# Patient Record
Sex: MALE | Race: WHITE
[De-identification: names, ages, dates, MRNs, and addresses within clinical notes are randomized per-mention and may not be internally consistent; named-entity substitution may affect disease eponyms.]

---

## 2018-11-30 ENCOUNTER — HOSPITAL ENCOUNTER (INPATIENT)
Dept: HOSPITAL 75 - ER | Age: 68
LOS: 7 days | Discharge: HOSPICE | DRG: 40 | End: 2018-12-07
Attending: FAMILY MEDICINE | Admitting: INTERNAL MEDICINE
Payer: MEDICARE

## 2018-11-30 VITALS — SYSTOLIC BLOOD PRESSURE: 112 MMHG | DIASTOLIC BLOOD PRESSURE: 70 MMHG

## 2018-11-30 VITALS — DIASTOLIC BLOOD PRESSURE: 82 MMHG | SYSTOLIC BLOOD PRESSURE: 125 MMHG

## 2018-11-30 VITALS — WEIGHT: 180 LBS | HEIGHT: 70 IN | BODY MASS INDEX: 25.77 KG/M2

## 2018-11-30 DIAGNOSIS — C78.02: ICD-10-CM

## 2018-11-30 DIAGNOSIS — C79.31: Primary | ICD-10-CM

## 2018-11-30 DIAGNOSIS — R41.82: ICD-10-CM

## 2018-11-30 DIAGNOSIS — J43.9: ICD-10-CM

## 2018-11-30 DIAGNOSIS — C34.81: ICD-10-CM
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
## 2018-11-30 DIAGNOSIS — C79.72: ICD-10-CM

## 2018-11-30 DIAGNOSIS — C79.71: ICD-10-CM

## 2018-11-30 DIAGNOSIS — F10.20: ICD-10-CM

## 2018-11-30 DIAGNOSIS — F03.90: ICD-10-CM

## 2018-11-30 DIAGNOSIS — S50.311A: ICD-10-CM

## 2018-11-30 DIAGNOSIS — C77.2: ICD-10-CM

## 2018-11-30 DIAGNOSIS — G93.6: ICD-10-CM

## 2018-11-30 DIAGNOSIS — C78.7: ICD-10-CM

## 2018-11-30 DIAGNOSIS — Z74.2: ICD-10-CM

## 2018-11-30 DIAGNOSIS — F17.210: ICD-10-CM

## 2018-11-30 DIAGNOSIS — R29.6: ICD-10-CM

## 2018-11-30 LAB
ALBUMIN SERPL-MCNC: 3.7 GM/DL (ref 3.2–4.5)
ALP SERPL-CCNC: 66 U/L (ref 40–136)
ALT SERPL-CCNC: 23 U/L (ref 0–55)
APTT PPP: YELLOW S
BACTERIA #/AREA URNS HPF: NEGATIVE /HPF
BASOPHILS # BLD AUTO: 0.1 10^3/UL (ref 0–0.1)
BASOPHILS NFR BLD AUTO: 1 % (ref 0–10)
BILIRUB SERPL-MCNC: 0.5 MG/DL (ref 0.1–1)
BILIRUB UR QL STRIP: NEGATIVE
BUN/CREAT SERPL: 18
CALCIUM SERPL-MCNC: 9.8 MG/DL (ref 8.5–10.1)
CHLORIDE SERPL-SCNC: 106 MMOL/L (ref 98–107)
CO2 SERPL-SCNC: 22 MMOL/L (ref 21–32)
CREAT SERPL-MCNC: 0.96 MG/DL (ref 0.6–1.3)
EOSINOPHIL # BLD AUTO: 0.2 10^3/UL (ref 0–0.3)
EOSINOPHIL NFR BLD AUTO: 2 % (ref 0–10)
ERYTHROCYTE [DISTWIDTH] IN BLOOD BY AUTOMATED COUNT: 13.8 % (ref 10–14.5)
FIBRINOGEN PPP-MCNC: CLEAR MG/DL
GFR SERPLBLD BASED ON 1.73 SQ M-ARVRAT: > 60 ML/MIN
GLUCOSE SERPL-MCNC: 94 MG/DL (ref 70–105)
GLUCOSE UR STRIP-MCNC: NEGATIVE MG/DL
HCT VFR BLD CALC: 44 % (ref 40–54)
HGB BLD-MCNC: 15 G/DL (ref 13.3–17.7)
KETONES UR QL STRIP: (no result)
LEUKOCYTE ESTERASE UR QL STRIP: (no result)
LYMPHOCYTES # BLD AUTO: 1.8 X 10^3 (ref 1–4)
LYMPHOCYTES NFR BLD AUTO: 19 % (ref 12–44)
MANUAL DIFFERENTIAL PERFORMED BLD QL: NO
MCH RBC QN AUTO: 32 PG (ref 25–34)
MCHC RBC AUTO-ENTMCNC: 34 G/DL (ref 32–36)
MCV RBC AUTO: 93 FL (ref 80–99)
MONOCYTES # BLD AUTO: 1.2 X 10^3 (ref 0–1)
MONOCYTES NFR BLD AUTO: 13 % (ref 0–12)
NEUTROPHILS # BLD AUTO: 6.2 X 10^3 (ref 1.8–7.8)
NEUTROPHILS NFR BLD AUTO: 66 % (ref 42–75)
NITRITE UR QL STRIP: NEGATIVE
PH UR STRIP: 5 [PH] (ref 5–9)
PLATELET # BLD: 332 10^3/UL (ref 130–400)
PMV BLD AUTO: 10.1 FL (ref 7.4–10.4)
POTASSIUM SERPL-SCNC: 4.4 MMOL/L (ref 3.6–5)
PROT SERPL-MCNC: 7.8 GM/DL (ref 6.4–8.2)
PROT UR QL STRIP: (no result)
RBC # BLD AUTO: 4.67 10^6/UL (ref 4.35–5.85)
RBC #/AREA URNS HPF: (no result) /HPF
SODIUM SERPL-SCNC: 140 MMOL/L (ref 135–145)
SP GR UR STRIP: 1.02 (ref 1.02–1.02)
SQUAMOUS #/AREA URNS HPF: (no result) /HPF
TSH SERPL DL<=0.05 MIU/L-ACNC: 0.93 UIU/ML (ref 0.35–4.94)
UROBILINOGEN UR-MCNC: 1 MG/DL
WBC # BLD AUTO: 9.5 10^3/UL (ref 4.3–11)
WBC #/AREA URNS HPF: (no result) /HPF

## 2018-11-30 PROCEDURE — 96374 THER/PROPH/DIAG INJ IV PUSH: CPT

## 2018-11-30 PROCEDURE — 71045 X-RAY EXAM CHEST 1 VIEW: CPT

## 2018-11-30 PROCEDURE — 81000 URINALYSIS NONAUTO W/SCOPE: CPT

## 2018-11-30 PROCEDURE — 71260 CT THORAX DX C+: CPT

## 2018-11-30 PROCEDURE — 80048 BASIC METABOLIC PNL TOTAL CA: CPT

## 2018-11-30 PROCEDURE — 88341 IMHCHEM/IMCYTCHM EA ADD ANTB: CPT

## 2018-11-30 PROCEDURE — 70450 CT HEAD/BRAIN W/O DYE: CPT

## 2018-11-30 PROCEDURE — 88305 TISSUE EXAM BY PATHOLOGIST: CPT

## 2018-11-30 PROCEDURE — 74177 CT ABD & PELVIS W/CONTRAST: CPT

## 2018-11-30 PROCEDURE — 80053 COMPREHEN METABOLIC PANEL: CPT

## 2018-11-30 PROCEDURE — 85027 COMPLETE CBC AUTOMATED: CPT

## 2018-11-30 PROCEDURE — 73080 X-RAY EXAM OF ELBOW: CPT

## 2018-11-30 PROCEDURE — 88184 FLOWCYTOMETRY/ TC 1 MARKER: CPT

## 2018-11-30 PROCEDURE — 88185 FLOWCYTOMETRY/TC ADD-ON: CPT

## 2018-11-30 PROCEDURE — 85025 COMPLETE CBC W/AUTO DIFF WBC: CPT

## 2018-11-30 PROCEDURE — 88342 IMHCHEM/IMCYTCHM 1ST ANTB: CPT

## 2018-11-30 PROCEDURE — 84443 ASSAY THYROID STIM HORMONE: CPT

## 2018-11-30 PROCEDURE — 36415 COLL VENOUS BLD VENIPUNCTURE: CPT

## 2018-11-30 PROCEDURE — 87081 CULTURE SCREEN ONLY: CPT

## 2018-11-30 RX ADMIN — Medication SCH ML: at 20:39

## 2018-11-30 RX ADMIN — PANTOPRAZOLE SODIUM SCH MG: 40 TABLET, DELAYED RELEASE ORAL at 16:11

## 2018-11-30 RX ADMIN — DEXAMETHASONE SCH MG: 4 TABLET ORAL at 23:38

## 2018-11-30 RX ADMIN — DEXAMETHASONE SCH MG: 4 TABLET ORAL at 17:59

## 2018-11-30 NOTE — DIAGNOSTIC IMAGING REPORT
PROCEDURE: CT head without contrast.



TECHNIQUE: Multiple contiguous axial images were obtained through

the brain without the use of intravenous contrast.



INDICATION: Falls.



Comparison is made with prior head CT from 10/02/2014.



There are numerous circumscribed hyperdense lesions throughout

bilateral cerebral and cerebellar hemispheres. There is also a

mass located within the khoi. The largest lesion is in the left

frontal lobe measuring 2 cm in diameter. The lesions range from a

7 mm to 20 mm. There is a moderate amount of perilesional edema

present. No midline shift is seen. No hydrocephalus is

identified.



IMPRESSION: Innumerable rounded hyperdense lesions throughout

bilateral cerebral and cerebellar hemispheres with surrounding

vasogenic edema. Features are most suggestive of widespread

intracranial metastatic disease.



Dictated by: 



  Dictated on workstation # [id number]

## 2018-11-30 NOTE — XMS REPORT
Continuity of Care Document

 Created on: 2018



ALECIA FRANCES

External Reference #: [id number]

: 1950

Sex: Male



Demographics







 Address  [address]  [address]

 

 Home Phone  [phone] x

 

 Preferred Language  Unknown

 

 Marital Status  Unknown

 

 [employer] Affiliation  Unknown

 

 Race  Unknown

 

 Ethnic Group  Unknown





Author







 Author  [facility]

 

 Address  Unknown

 

 Phone  Unavailable



              



Allergies

      





 Active            Description            Code            Type            
Severity            Reaction            Onset            Reported/Identified   
         Relationship to Patient            Clinical Status        

 

 Yes            No Known Drug Allergies            [id number]            Drug 
Allergy            Unknown            N/A                         2012   
                               

 

 Yes            codeine                         Drug Allergy            N/A    
        N/A                         10/02/2014                                  



                    



Medications

      



There is no data.                  



Problems

      





 Date Dx Coded            Attending            Type            Code            
Diagnosis            Diagnosed By        

 

 2012                         Ot            V58.32                       
           

 

 10/02/2014            RAJAN PEREZ MD                         682.1       
     CELLULITIS AND ABSCESS OF NECK                     

 

 10/05/2014            FLORA HARRELL, VÍCTOR CHAVARRIA            Ot            038.9      
                            

 

 10/05/2014            FLORA HARRELL, VÍCTOR CHAVARRIA            Ot            305.00     
                             

 

 10/05/2014            FLORA HARRELL, VÍCTOR CHAVARRIA            Ot            478.29     
                             

 

 10/05/2014            FLORA HARRELL, VÍCTOR CHAVARRIA            Ot            527.3      
                            

 

 10/05/2014            FLORA HARRELL, VÍCTOR CHAVARRIA            Ot            787.20     
                             

 

 10/05/2014            FLORA HARRELL, VÍCTOR AURA            Ot            995.91     
                             

 

 10/31/2014            LOWE DDS, ROSA MARIA            Ot            527.2        
                          

 

 2014            LOWE DDS, ROSA MARIA            Ot            527.2        
                          

 

 2014            LOWE DDS, ROSA MARIA            Ot            V72.84       
                           

 

 2014            LOWE DDS, ROSA MARIA            Ot            527.2        
                          

 

 2014            LOWE DDS, ROSA MARIA            Ot            V72.84       
                           



                                          



Procedures

      



There is no data.                  



Results

      



There is no data.              



Encounters

      





 ACCT No.            Visit Date/Time            Discharge            Status    
        Pt. Type            Provider            Facility            Loc./Unit  
          Complaint        

 

 [id number]            10/02/2014 14:01:00            10/02/2014 23:59:59          
  CLS            Outpatient            RAJAN PEREZ MD                     
                          

 

 [id number]            10/31/2014 11:58:00            10/31/2014 18:30:00    
        DIS            Outpatient            ROSA MARIA DE PAZ DDS            [facility]ia 
[facility]                     

 

 [id number]            10/24/2014 13:57:00            10/24/2014 23:59:59    
        CLS            Outpatient            ROSA MARIA DE PAZ DDS            [facility]ia 
[facility]            PREOP                     

 

 [id number]            10/02/2014 15:04:00            10/05/2014 13:50:00    
        DIS            Inpatient            FLORA HARRELL, VÍCTOR CHAVARRIA            [facility]ia 
[facility]            SURGICAL                     

 

 [id number]            2012 14:16:00                                   
   Document Registration

## 2018-11-30 NOTE — DIAGNOSTIC IMAGING REPORT
PROCEDURE: CT chest, abdomen, and pelvis with contrast.



TECHNIQUE: Multiple contiguous axial images were obtained through

the chest, abdomen, and pelvis after the administration of

intravenous contrast.



INDICATION: Brain lesions, altered mental status, cough.



FINDINGS:



Chest:



There is extensive thoracic lymphadenopathy, most suggestive of

widespread metastatic disease. Abnormal ovoid axillary nodes on

the left measured a maximal diameter of 2.9 cm. Right paramedian

prevascular anterior mediastinal orly mass is 2.7 cm. There is a

large aggregate confluent right superior paratracheal orly mass

displacing the junction of the innominate bones and SVC

anteriorly without venous obstruction. At the level of the

junction of the innominates, that mass measures 6.4 x 4.5 cm and

deviates the contour of the adjacent right lateral tracheal wall.

Its caudal extent of the mass is contiguous with right lower

paratracheal mediastinal adenopathy as well as contiguous with

right hilar adenopathy. The mass in aggregate at the level of the

torin is measuring 7.8 x 4.8 cm. There is subcarinal

lymphadenopathy. The largest subcarinal mass measuring 4.1 cm.

There is paraesophageal adenopathy measuring 2 cm. A right

superior pulmonary hilar node measures 2 cm. The left lower lobe

lung mass is likely metastatic measuring 1 cm. Subpleural mass in

the right lower lobe measured 8 mm. Right basilar pulmonary mass

measures 19 mm.



Abdomen and pelvis:



There are findings of widespread multifocal hepatic metastatic

disease involving left and right lobes. The largest right lobe

mass is 4.3 cm. A mass in the caudate lobe is 2.8 cm. Largest

left lobe mass is 2 cm. Thicker marker bilateral adrenal masses,

presumptively metastatic. The larger on the right measured 4 x

2.6 cm. There is upper abdominal mesenteric lymphadenopathy. The

largest bilobed lesion is 3.7 cm long axis. Pancreas itself

appeared unremarkable. There is no bile duct dilatation. The

unobstructed kidneys were normal. There is splenic granulomata,

benign and incidental, as well as adjacent splenules noted

incidentally. A mass lateral to the upper pole of the left kidney

and distal to the pancreatic tail is 1.5 cm, presumed metastatic

deposit. There is unruptured atherosclerotic irregular infrarenal

abdominal aortic aneurysm measuring 4.5 x 4.0 cm in transverse

dimensions. The aneurysm extends a cephalocaudal length of about

5 cm. It terminates prior to the aortic bifurcation and the

common internal and external iliac branches are calcified but

patent and nonaneurysmal. There is colonic constipation and

extensive sigmoid diverticulosis without features of acute

diverticulitis. The appendix is normal. Colon stool load, its

tortuosity, and its diverticulation is limited in its evaluation.

No identifiable colonic mass was found. Urinary bladder appeared

unremarkable. There was no suspicious lytic or sclerotic bony

lesion.



IMPRESSION:

1. A metastatic pattern in the chest with hilar, axillary, and

mediastinal lymphadenopathy. Multiple lung masses, likely

metastatic. Background COPD and interstitial lung disease,

chronic. No thoracic effusion or chest wall lesion evident

2. Abdomen and pelvis: Widespread metastatic disease to the

liver, abdominal lymph nodes, and adrenal glands. Diverticulated

and constipated colon appeared otherwise nonfocal. Unruptured

atherosclerotic infrarenal abdominal aortic aneurysm.



Dictated by: 



  Dictated on workstation # [id number]

## 2018-11-30 NOTE — XMS REPORT
Continuity of Care Document

 Created on: 2018



ALECIA FRANCES

External Reference #: [id number]

: 1950

Sex: Male



Demographics







 Address  [address]  [address]

 

 Home Phone  [phone] x

 

 Preferred Language  Unknown

 

 Marital Status  Unknown

 

 Scientologist Affiliation  Unknown

 

 Race  Unknown

 

 Ethnic Group  Unknown





Author







 Author  [facility]

 

 Address  Unknown

 

 Phone  Unavailable



              



Allergies

      





 Active            Description            Code            Type            
Severity            Reaction            Onset            Reported/Identified   
         Relationship to Patient            Clinical Status        

 

 Yes            No Known Drug Allergies            [id number]            Drug 
Allergy            Unknown            N/A                         2012   
                               

 

 Yes            codeine                         Drug Allergy            N/A    
        N/A                         10/02/2014                                  



                    



Medications

      



There is no data.                  



Problems

      





 Date Dx Coded            Attending            Type            Code            
Diagnosis            Diagnosed By        

 

 2012                         Ot            V58.32                       
           

 

 10/02/2014            RAJAN PEREZ MD                         682.1       
     CELLULITIS AND ABSCESS OF NECK                     

 

 10/05/2014            FLORA HARRELL, VÍCTOR CHAVARRIA            Ot            038.9      
                            

 

 10/05/2014            FLORA HARRELL, VÍCTOR CHAVARRIA            Ot            305.00     
                             

 

 10/05/2014            FLORA HARRELL, VÍCTOR CHAVARRIA            Ot            478.29     
                             

 

 10/05/2014            FLORA HARRELL, VÍCTOR CHAVARRIA            Ot            527.3      
                            

 

 10/05/2014            FLORA HARRELL, VÍCTOR CHAVARRIA            Ot            787.20     
                             

 

 10/05/2014            FLORA HARRELL, VÍCTOR AURA            Ot            995.91     
                             

 

 10/31/2014            LOWE DDS, ROSA MARIA            Ot            527.2        
                          

 

 2014            LOWE DDS, ROSA MARIA            Ot            527.2        
                          

 

 2014            LOWE DDS, ROSA MARIA            Ot            V72.84       
                           

 

 2014            LOWE DDS, ROSA MARIA            Ot            527.2        
                          

 

 2014            LOWE DDS, ROSA MARIA            Ot            V72.84       
                           



                                          



Procedures

      



There is no data.                  



Results

      



There is no data.              



Encounters

      





 ACCT No.            Visit Date/Time            Discharge            Status    
        Pt. Type            Provider            Facility            Loc./Unit  
          Complaint        

 

 [id number]            10/02/2014 14:01:00            10/02/2014 23:59:59          
  CLS            Outpatient            RAJAN PEREZ MD                     
                          

 

 [id number]            10/31/2014 11:58:00            10/31/2014 18:30:00    
        DIS            Outpatient            ROSA MARIA DE PAZ DDS            [facility]ia 
[facility]                     

 

 [id number]            10/24/2014 13:57:00            10/24/2014 23:59:59    
        CLS            Outpatient            ROSA MARIA DE PAZ DDS            [facility] 
[facility]            PREOP                     

 

 [id number]            10/02/2014 15:04:00            10/05/2014 13:50:00    
        DIS            Inpatient            FLORA HARRELL, VÍCTOR CHAVARRIA            [facility] 
[facility]            SURGICAL                     

 

 [id number]            2012 14:16:00                                   
   Document Registration

## 2018-11-30 NOTE — ED GENERAL
General


Chief Complaint:  Neurological Problems


Stated Complaint:  WEAKNESS;AMS


Nursing Triage Note:  


THE PT ARRIVAL BY EMS.











NO DISTRESS OR VISIBLE SX OF INJURY ARE REPORTED. 











LOC IS NORMAL FOR THE PT WITH A HX OF DEMENTIA.


Nursing Sepsis Screen:  No Definite Risk


Source of Information:  Patient, EMS, Family


Exam Limitations:  Physical Impairments





History of Present Illness


Date Seen by Provider:  2018


Time Seen by Provider:  11:45


Initial Comments


Patient presents by EMS with concerns about progressive weakness and frequent 

falls.  He is also had confusion.  He has been living with his frail older 

sister who has been caring for him for the past few weeks.  She presumed that 

he had progressive dementia.  Patient's condition has progressed to the point 

that sister can no longer care for him.  He has not seen a doctor since he 

received a health screening for placement in the [facility] a year ago.  He has no primary care provider.





Allergies and Home Medications


Allergies


Coded Allergies:  


     No Known Drug Allergies (Unverified , 12)





Home Medications


No Active Prescriptions or Reported Meds





Patient Home Medication List


Home Medication List Reviewed:  Yes





Review of Systems


Review of Systems


Constitutional:  see HPI


EENTM:  no symptoms reported


Respiratory:  no symptoms reported


Cardiovascular:  no symptoms reported


Genitourinary:  no symptoms reported


Musculoskeletal:  see HPI, other (Right elbow pain)


Skin:  see HPI, other (Skin tear on right elbow)


Psychiatric/Neurological:  See HPI


Hematologic/Lymphatic:  No Symptoms Reported


Immunological/Allergic:  no symptoms reported





Past Medical-Social-Family Hx


Past Med/Social Hx:  Reviewed and Corrections made


Patient Social History


Recent Foreign Travel:  No


Contact w/Someone Who Travel:  No


Recent Infectious Disease Expo:  No


Physical Abuse:  No


Sexual Abuse:  No


Mistreated:  No


Fear:  No





Past Medical History


Surgeries:  Yes (incision and drainage of submandibular abscess)


Respiratory:  No


Cardiac:  No


Neurological:  No


Reproductive Disorders:  No


Gastrointestinal:  No


Musculoskeletal:  No


Endocrine:  No


HEENT:  No


Cancer:  No


Did You Recieve Any Treatments:  No


Psychosocial:  Yes (history of alcoholism and went through rehabilitation 2017)


Adverse Reaction/Blood Tranf:  No





Family Medical History





Patient reports no known family medical history.





Physical Exam


Vital Signs





Vital Signs - First Documented








 18





 11:48 15:22 15:30


 


Temp 96.2  


 


Pulse 87  


 


Resp 18  


 


B/P (MAP) 112/83 (93)  


 


Pulse Ox  99 


 


O2 Delivery   Room Air





Capillary Refill : Less Than 3 Seconds


Height, Weight, BMI


Height: 5'10.00"


Weight: 180lbs. oz. 81.046375ru;  BMI


Method:Estimated


General Appearance:  No Apparent Distress, WD/WN


HEENT:  PERRL/EOMI, Normal ENT Inspection, Pharynx Normal


Neck:  Normal Inspection


Respiratory:  Lungs Clear, Normal Breath Sounds, No Accessory Muscle Use, No 

Respiratory Distress


Cardiovascular:  Regular Rate, Rhythm, No Edema, No Murmur


Gastrointestinal:  Non Tender, Soft


Extremity:  Other (abrasion and tenderness to the right elbow over the olecranon

)


Neurologic/Psychiatric:  Alert, No Motor/Sensory Deficits, CNs II-XII Norm as 

Tested, Other (confused with no focal motor deficits)


Skin:  Normal Color, Warm/Dry, Other (minor skin tear on the right elbow)





Progress/Results/Core Measures


Suspected Sepsis


Recent Fever Within 48 Hours:  No


Infection Criteria Present:  None


New/Unexplained  Altered Menta:  No


Sepsis Screen:  No Definite Risk


SIRS


Temperature:96.2 


Pulse: 87 


Respiratory Rate: 18


 


Laboratory Tests


18 11:35: White Blood Count 9.5


Blood Pressure 112 /83 


Mean: 93


 


Laboratory Tests


18 11:35: Platelet Count 332


18 12:09: 


Creatinine 0.96, Total Bilirubin 0.5








Results/Orders


Lab Results





Laboratory Tests








Test


 18


11:35 18


12:09 18


13:00 Range/Units


 


 


White Blood Count


 9.5 


 


 


 4.3-11.0


10^3/uL


 


Red Blood Count


 4.67 


 


 


 4.35-5.85


10^6/uL


 


Hemoglobin 15.0    13.3-17.7  G/DL


 


Hematocrit 44    40-54  %


 


Mean Corpuscular Volume 93    80-99  FL


 


Mean Corpuscular Hemoglobin 32    25-34  PG


 


Mean Corpuscular Hemoglobin


Concent 34 


 


 


 32-36  G/DL





 


Red Cell Distribution Width 13.8    10.0-14.5  %


 


Platelet Count


 332 


 


 


 130-400


10^3/uL


 


Mean Platelet Volume 10.1    7.4-10.4  FL


 


Neutrophils (%) (Auto) 66    42-75  %


 


Lymphocytes (%) (Auto) 19    12-44  %


 


Monocytes (%) (Auto) 13 H   0-12  %


 


Eosinophils (%) (Auto) 2    0-10  %


 


Basophils (%) (Auto) 1    0-10  %


 


Neutrophils # (Auto) 6.2    1.8-7.8  X 10^3


 


Lymphocytes # (Auto) 1.8    1.0-4.0  X 10^3


 


Monocytes # (Auto) 1.2 H   0.0-1.0  X 10^3


 


Eosinophils # (Auto)


 0.2 


 


 


 0.0-0.3


10^3/uL


 


Basophils # (Auto)


 0.1 


 


 


 0.0-0.1


10^3/uL


 


Sodium Level  140   135-145  MMOL/L


 


Potassium Level  4.4   3.6-5.0  MMOL/L


 


Chloride Level  106     MMOL/L


 


Carbon Dioxide Level  22   21-32  MMOL/L


 


Anion Gap  12   5-14  MMOL/L


 


Blood Urea Nitrogen  17   7-18  MG/DL


 


Creatinine


 


 0.96 


 


 0.60-1.30


MG/DL


 


Estimat Glomerular Filtration


Rate 


 > 60 


 


  





 


BUN/Creatinine Ratio  18    


 


Glucose Level  94     MG/DL


 


Calcium Level  9.8   8.5-10.1  MG/DL


 


Corrected Calcium  10.0   8.5-10.1  MG/DL


 


Total Bilirubin  0.5   0.1-1.0  MG/DL


 


Aspartate Amino Transf


(AST/SGOT) 


 32 


 


 5-34  U/L





 


Alanine Aminotransferase


(ALT/SGPT) 


 23 


 


 0-55  U/L





 


Alkaline Phosphatase  66     U/L


 


Total Protein  7.8   6.4-8.2  GM/DL


 


Albumin  3.7   3.2-4.5  GM/DL


 


TSH Cascade Testing


 


 0.93 


 


 0.35-4.94


UIU/ML


 


Urine Color   YELLOW   


 


Urine Clarity   CLEAR   


 


Urine pH   5  5-9  


 


Urine Specific Gravity   1.020  1.016-1.022  


 


Urine Protein   1+ H NEGATIVE  


 


Urine Glucose (UA)   NEGATIVE  NEGATIVE  


 


Urine Ketones   1+ H NEGATIVE  


 


Urine Nitrite   NEGATIVE  NEGATIVE  


 


Urine Bilirubin   NEGATIVE  NEGATIVE  


 


Urine Urobilinogen   1  NORMAL  MG/DL


 


Urine Leukocyte Esterase   1+ H NEGATIVE  


 


Urine RBC (Auto)   3+ H NEGATIVE  


 


Urine RBC   2-5 H  /HPF


 


Urine WBC   RARE   /HPF


 


Urine Squamous Epithelial


Cells 


 


 NONE 


  /HPF





 


Urine Crystals   NONE   /LPF


 


Urine Bacteria   NEGATIVE   /HPF


 


Urine Casts   NONE   /LPF


 


Urine Mucus   NEGATIVE   /LPF


 


Urine Culture Indicated   NO   








My Orders





Orders - SUSHIL GARCIA MD


Cbc With Automated Diff (18 11:48)


Comprehensive Metabolic Panel (18 11:48)


Ua Culture If Indicated (18 11:48)


Saline Lock/Iv-Start (18 11:48)


Chest 1 View, Ap/Pa Only (18 11:48)


Thyroid Analyzer (18 11:48)


Ct Head Wo (18 11:52)


Elbow, Right, 3 Views (18 11:52)


Dexamethasone Injection (Decadron Inject (18 14:30)


Nicotine Patch (Nicoderm Patch) (18 14:30)





Medications Given in ED





Current Medications








 Medications  Dose


 Ordered  Sig/Reyna


 Route  Start Time


 Stop Time Status Last Admin


Dose Admin


 


 Dexamethasone


 Sodium Phosphate  10 mg  ONCE  ONCE


 IV  18 14:30


 18 14:31 DC 18 14:37


10 MG


 


 Nicotine  21 mg  ONCE  ONCE


 TD  18 14:30


 18 14:31 DC 18 14:36


21 MG








Vital Signs/I&O











 18





 11:48 15:22 15:30 15:45


 


Temp 96.2 96.2 97.2 


 


Pulse 87 87 70 


 


Resp 18 18 20 


 


B/P (MAP) 112/83 (93) 112/83 (93) 125/82 (96) 


 


Pulse Ox  99 93 93


 


O2 Delivery   Room Air Room Air





Capillary Refill : Less Than 3 Seconds








Blood Pressure Mean:  93








Progress Note :  


Progress Note


Lab workup was fairly benign.  CT of the head demonstrated numerous lesions and 

chest x-ray showed a widened mediastinum.  These imaging findings suggest 

metastatic cancer.  There was some edema noted on the CT of the head.  

Dexamethasone will be given to prevent worsening of edema.  Patient was 

admitted to Dr. Pereyra with consultation with Dr. Dupont and Dr. Mcclellan.  CT of 

the chest, abdomen, and pelvis with contrast was placed on the bridging orders.

  I discussed the findings with patient and his sister including my concern for 

neoplastic disease.





Diagnostic Imaging





   Diagonstic Imaging:  Xray


   Plain Films/CT/US/NM/MRI:  chest


Comments


Chest x-ray viewed by me and report reviewed.  See report below:





NAME:   ALECIA FRANCES


MED REC#:   [id number]


ACCOUNT#:   [id number]


PT STATUS:   REG ER


:   1950


PHYSICIAN:   SUSHIL GARCIA MD


ADMIT DATE:   18/ER


***Draft***


Date of Exam:18





CHEST 1 VIEW, AP/PA ONLY








INDICATION: Altered mental status.





FINDINGS: Portable chest. There is mild interstitial lung disease


bilaterally more prominent in the lung bases. There are no


consolidated infiltrates. The heart is not enlarged. There is


definite fullness of the superior mediastinum. This may be


vascular or nonvascular in nature. No definite hilar lymph nodes


are seen. No pneumothorax or pleural effusion. No bony


abnormalities.





IMPRESSION:


1. There is considerable fullness of the mediastinum. Would


consider CT scan of the chest to further evaluate.


2. Bilateral interstitial lung disease which may be acute or


chronic. The heart is not large.





  Dictated on workstation # [id number]





Dict:   18 1300


Trans:   18 1302


[facility] [id number]





Interpreted by:     NINA PARKER MD








   Diagonstic Imaging:  Xray


   Plain Films/CT/US/NM/MRI:  elbow


Comments


Elbow x-ray viewed by me and report reviewed.  See report below:





NAME:   ALECIA FRANCES


MED REC#:   [id number]


ACCOUNT#:   [id number]


PT STATUS:   REG ER


:   1950


PHYSICIAN:   SUSHIL GARCIA MD


ADMIT DATE:   18/ER


***Draft***


Date of Exam:18





ELBOW, RIGHT, 3 VIEWS





EXAMINATION:


Right elbow at 1239 hours.





INDICATION: 


Skin tear to posterior elbow.





TECHNIQUE: 


Three views of the right elbow were obtained.





COMPARISON:  


There are no prior studies available for comparison.





FINDINGS:


There is no fracture, dislocation, or acute bony abnormality


evident. There is mild degenerative disease involving the elbow


joint. There is also a small bony excrescence along the posterior


superior aspect of the olecranon process. This is in the region


of the triceps tendon insertion and most likely is a sequela of


prior injury to the triceps tendon.





There is soft tissue edema along the posterior aspect of the


elbow joint. This could be related to mild olecranon bursitis or


merely to soft tissue edema alone. There is no radiopaque foreign


body identified. 





IMPRESSION: 


There is soft tissue edema along the posterior aspect of the


elbow joint but there is no evidence for a radiopaque foreign


body or for an acute bony abnormality. 





  Dictated on workstation # [id number]





Dict:   18 1445


Trans:   18 1453


 [id number]





Interpreted by:     CHEN LOPEZ MD








   Diagonstic Imaging:  CT


   Plain Films/CT/US/NM/MRI:  head


Comments


CT head viewed by me and report reviewed.  See report below:





NAME:   ALECIA FRANCES


MED REC#:   [id number]


ACCOUNT#:   [id number]


PT STATUS:   REG ER


:   1950


PHYSICIAN:   SUSHIL GARCIA MD


ADMIT DATE:   18/ER


***Draft***


Date of Exam:18





CT HEAD WO





PROCEDURE: CT head without contrast.





TECHNIQUE: Multiple contiguous axial images were obtained through


the brain without the use of intravenous contrast.





INDICATION: Falls.





Comparison is made with prior head CT from 10/02/2014.





There are numerous circumscribed hyperdense lesions throughout


bilateral cerebral and cerebellar hemispheres. There is also a


mass located within the khoi. The largest lesion is in the left


frontal lobe measuring 2 cm in diameter. The lesions range from a


7 mm to 20 mm. There is a moderate amount of perilesional edema


present. No midline shift is seen. No hydrocephalus is


identified.





IMPRESSION: Innumerable rounded hyperdense lesions throughout


bilateral cerebral and cerebellar hemispheres with surrounding


vasogenic edema. Features are most suggestive of widespread


intracranial metastatic disease.





  Dictated on workstation # [id number]





Dict:   18 1449


Trans:   18 1453


[facility] [id number]





Interpreted by:     HOME WING MD





Departure


Communication (Admissions)


Time/Spoke to Admitting Phy:  14:15


Dr. Pereyra


Time/Spoke to Consulting Phy:  14:35


Dr. Dupont and Dr. Mcclellan





Impression





 Primary Impression:  


 Brain lesion


 Additional Impressions:  


 Cerebral edema


 Weakness


 Altered mental status


 Qualified Codes:  R41.82 - Altered mental status, unspecified


 Mediastinal widening


 Injury of right elbow


 Qualified Codes:  S59.901A - Unspecified injury of right elbow, initial 

encounter


Disposition:   ADMITTED AS INPATIENT


Condition:  Stable





Admissions


Decision to Admit Reason:  Admit from ER (General)


Decision to Admit/Date:  2018


Time/Decision to Admit Time:  14:00





Departure-Patient Inst.


Referrals:  


NO,LOCAL PHYSICIAN (PCP/Family)


Primary Care Physician


Scripts


No Active Prescriptions or Reported Meds











SUSHIL GARCIA MD 2018 14:41

## 2018-11-30 NOTE — DIAGNOSTIC IMAGING REPORT
EXAMINATION:

Right elbow at 1239 hours.



INDICATION: 

Skin tear to posterior elbow.



TECHNIQUE: 

Three views of the right elbow were obtained.



COMPARISON:  

There are no prior studies available for comparison.



FINDINGS:

There is no fracture, dislocation, or acute bony abnormality

evident. There is mild degenerative disease involving the elbow

joint. There is also a small bony excrescence along the posterior

superior aspect of the olecranon process. This is in the region

of the triceps tendon insertion and most likely is a sequela of

prior injury to the triceps tendon.



There is soft tissue edema along the posterior aspect of the

elbow joint. This could be related to mild olecranon bursitis or

merely to soft tissue edema alone. There is no radiopaque foreign

body identified. 



IMPRESSION: 

There is soft tissue edema along the posterior aspect of the

elbow joint but there is no evidence for a radiopaque foreign

body or for an acute bony abnormality. 



Dictated by: 



  Dictated on workstation # [id number]

## 2018-11-30 NOTE — DIAGNOSTIC IMAGING REPORT
INDICATION: Altered mental status.



FINDINGS: Portable chest. There is mild interstitial lung disease

bilaterally more prominent in the lung bases. There are no

consolidated infiltrates. The heart is not enlarged. There is

definite fullness of the superior mediastinum. This may be

vascular or nonvascular in nature. No definite hilar lymph nodes

are seen. No pneumothorax or pleural effusion. No bony

abnormalities.



IMPRESSION:

1. There is considerable fullness of the mediastinum. Would

consider CT scan of the chest to further evaluate.

2. Bilateral interstitial lung disease which may be acute or

chronic. The heart is not large.



Dictated by: 



  Dictated on workstation # [id number]

## 2018-12-01 VITALS — SYSTOLIC BLOOD PRESSURE: 150 MMHG | DIASTOLIC BLOOD PRESSURE: 68 MMHG

## 2018-12-01 VITALS — DIASTOLIC BLOOD PRESSURE: 72 MMHG | SYSTOLIC BLOOD PRESSURE: 103 MMHG

## 2018-12-01 VITALS — DIASTOLIC BLOOD PRESSURE: 75 MMHG | SYSTOLIC BLOOD PRESSURE: 131 MMHG

## 2018-12-01 VITALS — SYSTOLIC BLOOD PRESSURE: 108 MMHG | DIASTOLIC BLOOD PRESSURE: 75 MMHG

## 2018-12-01 VITALS — DIASTOLIC BLOOD PRESSURE: 75 MMHG | SYSTOLIC BLOOD PRESSURE: 116 MMHG

## 2018-12-01 VITALS — DIASTOLIC BLOOD PRESSURE: 79 MMHG | SYSTOLIC BLOOD PRESSURE: 127 MMHG

## 2018-12-01 RX ADMIN — DEXAMETHASONE SCH MG: 4 TABLET ORAL at 12:38

## 2018-12-01 RX ADMIN — Medication SCH ML: at 17:45

## 2018-12-01 RX ADMIN — Medication SCH ML: at 06:21

## 2018-12-01 RX ADMIN — DEXAMETHASONE SCH MG: 4 TABLET ORAL at 18:14

## 2018-12-01 RX ADMIN — Medication SCH ML: at 19:53

## 2018-12-01 RX ADMIN — PANTOPRAZOLE SODIUM SCH MG: 40 TABLET, DELAYED RELEASE ORAL at 06:21

## 2018-12-01 RX ADMIN — DEXAMETHASONE SCH MG: 4 TABLET ORAL at 06:21

## 2018-12-01 NOTE — ONCOLOGY CONSULTATION
Visit Information


Visit Information


Date of Admission


Nov 30, 2018 at 14:38


Attending Physician


Eric Pereyra MD


Admitting Physician


No,Local Physician


Chief Complaint


confusion and general weakness. CT showed multiple brain and lung lesions


Interval History


Mr. Frances is a 68 year old white man brought to ER by his sister yesterday 

complaining of confusion and weakness. Long history of alcoholism and COPD. His 

sister is not able to take care of him at home any more. Pt is confused and not 

able to give any history. He told me this is 1966. The president [place] the [place] 

[place] is [place]. However, he is able eat full meals and go to bathroom by 

himself. 


CT of head showed multiple brain lesions with surrounding edema. ER started 

Decadron last night.


CT of chest, abd and pelvis showed multiple large masses in the lung and also 

multiple lesions in the liver. See CT report below for details.


I consulted the patient on:


12/1/18


 09:48


Time Seen by Provider:  10:07





Review of Systems


Constitutional:  weakness


EENTM:  see HPI


Respiratory:  short of breath


Cardiovascular:  no symptoms reported


Gastrointestinal:  constipation


Psychiatric/Neurological:  See HPI





Health Status


Allergies


Coded Allergies:  


     No Known Drug Allergies (Unverified , 7/21/12)





Home Medications


No Active Prescriptions or Reported Meds





PMH-Social-Family Hx


Patient Social History


Alcohol Use:  Past History


Recreational Drug Use:  No


Smoking Status:  Current Everyday Smoker


Recent Foreign Travel:  No


Contact w/other who traveled:  No


Recent Infectious Disease Expo:  No


Physical Abuse Screen:  No


Sexual Abuse:  No





Family Medical History


Family History:  


Patient reports no known family medical history.





Physical Exam


Vital Signs





Vital Signs - First Documented








 11/30/18 11/30/18 11/30/18





 11:48 15:22 15:30


 


Temp 96.2  


 


Pulse 87  


 


Resp 18  


 


B/P (MAP) 112/83 (93)  


 


Pulse Ox  99 


 


O2 Delivery   Room Air





Capillary Refill : Less Than 3 Seconds


Height, Weight, BMI


Height: 5'10.00"


Weight: 180lbs. 0.0oz. 81.254415pt; 25.8 BMI


Method:Estimated


General Appearance:  No Apparent Distress


HEENT:  PERRL/EOMI


Neck:  Non Tender, Supple


Respiratory:  Chest Non Tender, Lungs Clear, No Accessory Muscle Use, No 

Respiratory Distress


Cardiovascular:  Regular Rate, Rhythm, No Edema, No JVD


Gastrointestinal:  Non Tender, Soft


Extremity:  Non Tender, No Calf Tenderness, No Pedal Edema


Neurologic/Psychiatric:  Disoriented (confused. Not able to give history)





Data Review


Labs


Laboratory Tests


12/3/18 05:25








Laboratory Tests


12/3/18 05:25: 


Blood Urea Nitrogen 20H, Glucose Level 133H








Radiology


CT of the Head 11-30-18 w w/o





There are numerous circumscribed hyperdense lesions throughout


bilateral cerebral and cerebellar hemispheres. There is also a


mass located within the khoi. The largest lesion is in the left


frontal lobe measuring 2 cm in diameter. The lesions range from a


7 mm to 20 mm. There is a moderate amount of perilesional edema


present. No midline shift is seen. No hydrocephalus is


identified.





IMPRESSION: Innumerable rounded hyperdense lesions throughout


bilateral cerebral and cerebellar hemispheres with surrounding


vasogenic edema. Features are most suggestive of widespread


intracranial metastatic disease.








NAME:      ALECIA FRANCES


MED REC#:   [id number]


ACCOUNT#:   [id number]


PHYSICIAN:    ERIC PEREYRA MD


 











Date of Exam:   11/30/18





CT CHEST/ABDOMEN/PELVIS W


 





PROCEDURE: CT chest, abdomen, and pelvis with contrast.





TECHNIQUE: Multiple contiguous axial images were obtained through


the chest, abdomen, and pelvis after the administration of


intravenous contrast.





INDICATION: Brain lesions, altered mental status, cough.





FINDINGS:





Chest:





There is extensive thoracic lymphadenopathy, most suggestive of


widespread metastatic disease. Abnormal ovoid axillary nodes on


the left measured a maximal diameter of 2.9 cm. Right paramedian


prevascular anterior mediastinal orly mass is 2.7 cm. There is a


large aggregate confluent right superior paratracheal orly mass


displacing the junction of the innominate bones and SVC


anteriorly without venous obstruction. At the level of the


junction of the innominates, that mass measures 6.4 x 4.5 cm and


deviates the contour of the adjacent right lateral tracheal wall.


Its caudal extent of the mass is contiguous with right lower


paratracheal mediastinal adenopathy as well as contiguous with


right hilar adenopathy. The mass in aggregate at the level of the


torin is measuring 7.8 x 4.8 cm. There is subcarinal


lymphadenopathy. The largest subcarinal mass measuring 4.1 cm.


There is paraesophageal adenopathy measuring 2 cm. A right


superior pulmonary hilar node measures 2 cm. The left lower lobe


lung mass is likely metastatic measuring 1 cm. Subpleural mass in


the right lower lobe measured 8 mm. Right basilar pulmonary mass


measures 19 mm.





Abdomen and pelvis:





There are findings of widespread multifocal hepatic metastatic


disease involving left and right lobes. The largest right lobe


mass is 4.3 cm. A mass in the caudate lobe is 2.8 cm. Largest


left lobe mass is 2 cm. Thicker marker bilateral adrenal masses,


presumptively metastatic. The larger on the right measured 4 x


2.6 cm. There is upper abdominal mesenteric lymphadenopathy. The


largest bilobed lesion is 3.7 cm long axis. Pancreas itself


appeared unremarkable. There is no bile duct dilatation. The


unobstructed kidneys were normal. There is splenic granulomata,


benign and incidental, as well as adjacent splenules noted


incidentally. A mass lateral to the upper pole of the left kidney


and distal to the pancreatic tail is 1.5 cm, presumed metastatic


deposit. There is unruptured atherosclerotic irregular infrarenal


abdominal aortic aneurysm measuring 4.5 x 4.0 cm in transverse


dimensions. The aneurysm extends a cephalocaudal length of about


5 cm. It terminates prior to the aortic bifurcation and the


common internal and external iliac branches are calcified but


patent and nonaneurysmal. There is colonic constipation and


extensive sigmoid diverticulosis without features of acute


diverticulitis. The appendix is normal. Colon stool load, its


tortuosity, and its diverticulation is limited in its evaluation.


No identifiable colonic mass was found. Urinary bladder appeared


unremarkable. There was no suspicious lytic or sclerotic bony


lesion.





IMPRESSION:


1. A metastatic pattern in the chest with hilar, axillary, and


mediastinal lymphadenopathy. Multiple lung masses, likely


metastatic. Background COPD and interstitial lung disease,


chronic. No thoracic effusion or chest wall lesion evident


2. Abdomen and pelvis: Widespread metastatic disease to the


liver, abdominal lymph nodes, and adrenal glands. Diverticulated


and constipated colon appeared otherwise nonfocal. Unruptured


atherosclerotic infrarenal abdominal aortic aneurysm.





Dictated by: 





  Dictated on workstation # [id number]





[id number]





Dict:      11/30/18 1710


Trans:      11/30/18 1752





Interpreted by:         GIOVANNI FRAUSTO


Electronically signed by:   GIOVANNI FRAUSTO 11/30/18 1752





Impression & Plan


Impression & Plan


IMP:


1. Multiple brain lesions with surrounding edema, symptoms of confusion and 

general weakness. 


2. Multiple lung lesions, adrenal and liver lesions, mostly carcinoma of lung 

origin with extensive liver mets and adrenal mets. 


3. H/o alcoholism


4. COPD


5. His sister is not able to take care of him at home any more.


Plan:


1. Tissue diagnosis. Most likely diagnosis, carcinoma of the lung origin. 


2. Consult Rad/Onc Dr. Durand on Monday


3. Continue Decadron 4mg po qid


4. Pepcid or PI while on Decadron


5.  consult.











WHITLEY BLOCK MD Dec 1, 2018 09:53

## 2018-12-01 NOTE — HISTORY & PHYSICAL-HOSPITALIST
History of Present Illness


HPI/Chief Complaint


Mr. Colon is a 68-year-old white male who had been staying with his sister who 

brought him into the emergency room the evening of  reporting she 

could no longer care for him.  According to the emergency room physician she 

was quite small and frail herself and stated that he had been increasingly 

confused over the past 1-2 weeks had become generally weak despite a reasonable 

appetite and it had several falls.  There have been no reported loss of 

consciousness or head trauma.  He was pleasant upon my arrival voicing no 

complaints but thought he was in the [facility] and did not know why he 

was here.  All he could tell me was that things got worse suddenly but did not 

elaborate.  He did begin sentences and then stop in mid sentence and continue 

to eat.  He denied any problems with headaches vision change on the reporting 

generalized weakness.  He denied any pain and denies any focal areas of 

weakness or numbness.  He reported no problems with bowel or bladder control 

although his history is somewhat suspect.  He also denied night sweats chills 

fever or chest pain cough or hemoptysis.


Date Seen


18


Time Seen by a Provider:  08:00


Attending Physician


Eric Pereyra MD


PCP


No,Local Physician


Referring Physician





Date of Admission


2018 at 14:38





Home Medications & Allergies


Home Medications


Reviewed patient Home Medication Reconciliation performed by pharmacy 

medication reconciliations technician and/or nursing.


Patients Allergies have been reviewed.





Allergies





Allergies


Coded Allergies


  No Known Drug Allergies (Unverified12)








Past Medical-Social-Family Hx


Past Med/Social Hx:  Reviewed and Corrections made


Patient Social History


Alcohol Use:  Past History


Recreational Drug Use:  No


Smoking Status:  Current Everyday Smoker


Physical Abuse Screen:  No


Sexual Abuse:  No


Recent Foreign Travel:  No


Contact w/other who traveled:  No


Recent Infectious Disease Expo:  No





Past Medical History


Reproductive:  No


Did You Recieve Any Treatments:  No


History of Blood Disorders:  No


Adverse Reaction to Blood Diaz:  No





Family History





Patient reports no known family medical history.





Review of Systems


Constitutional:  see HPI


Respiratory:  see HPI


Gastrointestinal:  other (Denies abdominal pain and bright red blood per rectum 

or melena.)


Psychiatric/Neurological:  See HPI





Physical Exam


Physical Exam


Vital Signs





Vital Signs - First Documented








 18





 11:48 15:22 15:30


 


Temp 96.2  


 


Pulse 87  


 


Resp 18  


 


B/P (MAP) 112/83 (93)  


 


Pulse Ox  99 


 


O2 Delivery   Room Air





Capillary Refill : Less Than 3 Seconds


Height, Weight, BMI


Height: 5'10.00"


Weight: 180lbs. 0.0oz. 81.773594hp; 25.8 BMI


Method:Estimated


General Appearance:  No Apparent Distress, WD/WN


HEENT:  PERRL/EOMI


Neck:  Full Range of Motion, Non Tender, Supple, Lymphadenopathy (R) (Roughly 2 

cm firm but mobile node underneath the right external jugular vein just above 

the clavicle.  No other definitive cervical adenopathy is noted.)


Respiratory:  Chest Non Tender, Lungs Clear, Normal Breath Sounds, No Accessory 

Muscle Use, No Respiratory Distress


Cardiovascular:  Regular Rate, Rhythm, No Edema, No Gallop, No JVD, No Murmur, 

Normal Peripheral Pulses


Gastrointestinal:  Normal Bowel Sounds, No Organomegaly, No Pulsatile Mass, Non 

Tender, Soft


Extremity:  Normal Capillary Refill, Normal Inspection, Normal Range of Motion, 

Non Tender, No Calf Tenderness, No Pedal Edema, Other (Firm but mobile left 

axillary node likely in the 3-4 cm range rather deep.  There is no avulsion of 

skin injury over the left elbow no surrounding significant erythema or 

induration noted.  It appears relatively clean.)


Neurologic/Psychiatric:  Normal Mood/Affect, CNs II-XII Norm as Tested, Other (

Patient is alert oriented to person only he's able to move all extremities but 

right lower extremity reveals 4+ strength (5+ strength.)


Skin:  Normal Color, Warm/Dry


Lymphatic:  Other (See above)





Results


Results/Procedures


Labs


Laboratory Tests


18 11:35








18 12:09








Patient resulted labs reviewed.





Assessment/Plan


Admission Diagnosis


1.  Altered mental status and right lower extremity weakness secondary to 

likely rather diffuse cerebral metastasis with significant mediastinal 

adenopathy left axillary adenopathy and right pathologic feeling lymph node 

under the external jugular vein.  After discussion with Dr. Dupont and Dr. Raymond we are in agreement that the external jugular node likely be the safest 

way to get tissue.   for discussion is in agreement as well and will be 

seeing the patient.  In the interim we'll continue Decadron 4 mg IV every 6 

with dosage management per oncology.  The patient will likely require placement 

in a nursing care facility upon discharge.


2.  History of alcohol use disorder it is unclear as to whether or not there's 

been any recent drinking.  There is no history of any other alcohol related 

medical complications.  3.  Tobaccoism likely contributing to number 1 tissue 

diagnosis pending.


Admission Status:  Inpatient Order (span 2 midnights)


Reason for Inpatient Admission:  


See admission diagnosis.





Assessment and Plan


See admission diagnosis.





Clinical Quality Measures


DVT/VTE Risk/Contraindication:


Risk Factor Score Per Nursin


RFS Level Per Nursing on Admit:  4+=Very High











REIC PEREYRA MD Dec 1, 2018 10:13

## 2018-12-01 NOTE — PULMONARY CONSULTATION
History of Present Illness


History of Present Illness


Date of Consultation


12/1/18


 09:34


Date of Admission








Allergies and Home Medications


Allergies


Coded Allergies:  


     No Known Drug Allergies (Unverified , 7/21/12)





Home Medications


No Active Prescriptions or Reported Meds





Past Medical-Social-Family Hx


Past Med/Social Hx:  Reviewed and Corrections made


Patient Social History


Alcohol Use:  Past History


Recreational Drug Use:  No


Smoking Status:  Current Everyday Smoker


Recent Foreign Travel:  No


Contact w/Someone Who Travel:  No


Recent Infectious Disease Expo:  No


Physical Abuse:  No


Sexual Abuse:  No


Mistreated:  No


Fear:  No





Past Medical History


Surgeries:  Yes (incision and drainage of submandibular abscess)


Respiratory:  No


Cardiac:  No


Neurological:  No


Reproductive Disorders:  No


Gastrointestinal:  No


Musculoskeletal:  No


Endocrine:  No


HEENT:  No


Cancer:  No


Did You Recieve Any Treatments:  No


Psychosocial:  Yes (history of alcoholism and went through rehabilitation 2017)


Integumentary:  No


Blood Disorders:  No


Adverse Reaction/Blood Tranf:  No





Family Medical History





Patient reports no known family medical history.





Sepsis Event


Evaluation


Height, Weight, BMI


Height: 5'10.00"


Weight: 180lbs. 0.0oz. 81.838682fr; 25.8 BMI


Method:Estimated





Exam


Exam





Vital Signs








  Date Time  Temp Pulse Resp B/P (MAP) Pulse Ox O2 Delivery O2 Flow Rate FiO2


 


12/1/18 08:00 98.1 84 18 150/68 (95) 96 Room Air  


 


12/1/18 08:00      Room Air  


 


12/1/18 04:06 97.2 68 18 127/79 (95) 96 Room Air  


 


12/1/18 00:30 97.8 69 18 116/75 (89) 96 Room Air  


 


11/30/18 20:03 97.4 79 18 112/70 (84) 95 Room Air  


 


11/30/18 20:00      Room Air  


 


11/30/18 15:45     93 Room Air  


 


11/30/18 15:30 97.2 70 20 125/82 (96) 93 Room Air  


 


11/30/18 15:22 96.2 87 18 112/83 (93) 99   


 


11/30/18 11:48 96.2 87 18 112/83 (93)    














I & O 


 


 12/1/18





 07:00


 


Intake Total 1700 ml


 


Balance 1700 ml








Height & Weight


Height: 5'10.00"


Weight: 180lbs. 0.0oz. 81.987639yq; 25.8 BMI


Method:Estimated


General Appearance:  No Apparent Distress, WD/WN


HEENT:  PERRL/EOMI, Normal ENT Inspection, Pharynx Normal


Neck:  Normal Inspection


Respiratory:  Lungs Clear, Normal Breath Sounds, No Accessory Muscle Use, No 

Respiratory Distress


Cardiovascular:  Regular Rate, Rhythm, No Edema, No Murmur


Capillary Refill:  Less Than 3 Seconds


Extremity:  Other (abrasion and tenderness to the right elbow over the olecranon

)


Neurologic/Psychiatric:  Alert, No Motor/Sensory Deficits, CNs II-XII Norm as 

Tested, Other (confused with no focal motor deficits)


Skin:  Normal Color, Warm/Dry, Other (minor skin tear on the right elbow)





Results


Lab


Laboratory Tests


11/30/18 11:35








11/30/18 12:09











Assessment/Plan


Assessment/Plan


Severe emphysema/COPD 


Severe mediastinal lymphadenopathy and brain lesions


   -I discussed with Dr. Pereyra and he is going to have surgery bx a 

supraclavicular lymph node.


      -He will let me know if bronchoscopy/EBUS is needed


Dementia/debility











CHRIS HOLLOWAY DO Dec 1, 2018 09:36

## 2018-12-01 NOTE — CONSULTATION
History of Present Illness


History of Present Illness


Patient Consulted On(ofe/time)


18


 12:54


Date Seen by Provider:  Dec 1, 2018


Time Seen by Provider:  12:15


Reason for Visit:  mental status changes and weakness of his body


History of Present Illness


patient admitted with mental status changes and weakness of this body, with the 

discovery of diffuse metastatic disease including cerebral lesions and 

associated edema.  Concern about primary lung carcinoma with widespread 

metastasis being raised.  I have been asked to see him for consideration of 

lymph node biopsy, to establish a definitive histologic diagnosis





Allergies and Home Medications


Allergies


Coded Allergies:  


     No Known Drug Allergies (Unverified , 12)





Home Medications


No Active Prescriptions or Reported Meds





Patient Home Medication List


Home Medication List Reviewed:  Yes





Past Medical-Social-Family Hx


Past Med/Social Hx:  Reviewed and Corrections made


Patient Social History


Alcohol Use:  Past History


Recreational Drug Use:  No


Smoking Status:  Current Everyday Smoker


Recent Foreign Travel:  No


Contact w/Someone Who Travel:  No


Recent Infectious Disease Expo:  No


Physical Abuse:  No


Sexual Abuse:  No


Mistreated:  No


Fear:  No





Past Medical History


Surgeries:  Yes (incision and drainage of submandibular abscess)


Respiratory:  No


Cardiac:  No


Neurological:  No


Reproductive Disorders:  No


Gastrointestinal:  No


Musculoskeletal:  No


Endocrine:  No


HEENT:  No


Cancer:  No


Did You Recieve Any Treatments:  No


Psychosocial:  Yes (history of alcoholism and went through rehabilitation 2017)


Integumentary:  No


Blood Disorders:  No


Adverse Reaction/Blood Tranf:  No





Family Medical History





Patient reports no known family medical history.





Review of Systems-General


Constitutional:  see HPI


EENTM:  see HPI


Respiratory:  cough, dyspnea on exertion


Cardiovascular:  no symptoms reported


Gastrointestinal:  no symptoms reported


Genitourinary:  no symptoms reported


Musculoskeletal:  back pain


Skin:  no symptoms reported


Psychiatric/Neurological:  See HPI





Physical Exam-General Problems


Physical Exam


Vital Signs





Vital Signs - First Documented








 18





 11:48 15:22 15:30


 


Temp 96.2  


 


Pulse 87  


 


Resp 18  


 


B/P (MAP) 112/83 (93)  


 


Pulse Ox  99 


 


O2 Delivery   Room Air





Capillary Refill : Less Than 3 Seconds


General Appearance:  other


Neck:  lymphadenopathy (R)


Respiratory:  decreased breath sounds, crackles


Cardiovascular:  regular rate, rhythm


Gastrointestinal:  non tender, soft, no organomegaly


Rectal:  deferred


Extremities:  normal inspection


Neurologic/Psychiatric:  facial droop, other


Skin:  warm/dry


Comments


large, mobile and nontender lymph node palpable in the left axilla.  Nontender 

lymph node over the right jugulodigastric region.





Assessment/Plan


Assessment/Plan


Admission Diagnosis/Plan


gentleman with diffuse metastatic lesion including cerebral lesions.  Primary 

lung carcinoma suspected.  It is reasonable to perform biopsy of the left 

axillary lymph node to establish a definitive diagnosis.  This is being 

scheduled for Monday, 3 December


Admission Status:  Inpatient Order (span 2 midnights)


Reason for Inpatient Admission:  


evaluation and management lasting more than 3 days





Clinical Quality Measures


DVT/VTE Risk/Contraindication:


Risk Factor Score Per Nursin


RFS Level Per Nursing on Admit:  4+=Very High











USAMA WHITE MD Dec 1, 2018 12:58

## 2018-12-02 VITALS — DIASTOLIC BLOOD PRESSURE: 63 MMHG | SYSTOLIC BLOOD PRESSURE: 106 MMHG

## 2018-12-02 VITALS — DIASTOLIC BLOOD PRESSURE: 64 MMHG | SYSTOLIC BLOOD PRESSURE: 98 MMHG

## 2018-12-02 VITALS — SYSTOLIC BLOOD PRESSURE: 100 MMHG | DIASTOLIC BLOOD PRESSURE: 64 MMHG

## 2018-12-02 RX ADMIN — DEXAMETHASONE SCH MG: 4 TABLET ORAL at 06:19

## 2018-12-02 RX ADMIN — DEXAMETHASONE SCH MG: 4 TABLET ORAL at 13:04

## 2018-12-02 RX ADMIN — DEXAMETHASONE SCH MG: 4 TABLET ORAL at 23:38

## 2018-12-02 RX ADMIN — PANTOPRAZOLE SODIUM SCH MG: 40 TABLET, DELAYED RELEASE ORAL at 06:19

## 2018-12-02 RX ADMIN — DEXAMETHASONE SCH MG: 4 TABLET ORAL at 18:09

## 2018-12-02 RX ADMIN — Medication SCH ML: at 20:53

## 2018-12-02 RX ADMIN — Medication SCH ML: at 06:19

## 2018-12-02 RX ADMIN — Medication SCH ML: at 14:19

## 2018-12-02 RX ADMIN — DEXAMETHASONE SCH MG: 4 TABLET ORAL at 00:15

## 2018-12-02 NOTE — PROGRESS NOTE-HOSPITALIST
Subjective


HPI/CC On Admission


Date Seen by Provider:  Dec 2, 2018


Time Seen by Provider:  09:45


Mr. Colon is a 68-year-old white male who had been staying with his sister who 

brought him into the emergency room the evening of  reporting she 

could no longer care for him.  According to the emergency room physician she 

was quite small and frail herself and stated that he had been increasingly 

confused over the past 1-2 weeks had become generally weak despite a reasonable 

appetite and it had several falls.  There have been no reported loss of 

consciousness or head trauma.  He was pleasant upon my arrival voicing no 

complaints but thought he was in the [facility] and did not know why he 

was here.  All he could tell me was that things got worse suddenly but did not 

elaborate.  He did begin sentences and then stop in mid sentence and continue 

to eat.  He denied any problems with headaches vision change on the reporting 

generalized weakness.  He denied any pain and denies any focal areas of 

weakness or numbness.  He reported no problems with bowel or bladder control 

although his history is somewhat suspect.  He also denied night sweats chills 

fever or chest pain cough or hemoptysis.


Subjective/Events-last exam


Patient noted to be awake and alert voicing no complaints.  He denies headache.

  His level of alertness was improved he was speaking in full sentences.  When 

asked about a neurologic change he only reported a 2 year history of left upper 

extremity numbness with no reported weakness or control problems.  He reports 

that this symptom has changed little over the past 2 years and has noted that 

this any worse than usual today.  He denies any past history of known stroke.  

He's had no nausea and his voice no complaints to staff.





Objective


Exam


Vital Signs





Vital Signs








  Date Time  Temp Pulse Resp B/P (MAP) Pulse Ox O2 Delivery O2 Flow Rate FiO2


 


18 00:00 98.0 63 18 100/64 (76) 94 Room Air  





Capillary Refill : Less Than 3 Seconds


General Appearance:  No Apparent Distress, WD/WN


Neck:  Full Range of Motion, Non Tender, Supple, Lymphadenopathy (R)


Respiratory:  Chest Non Tender, Lungs Clear, Normal Breath Sounds, No Accessory 

Muscle Use, No Respiratory Distress


Cardiovascular:  Regular Rate, Rhythm, No Edema, No Gallop, No JVD, No Murmur, 

Normal Peripheral Pulses


Gastrointestinal:  Normal Bowel Sounds, No Organomegaly, No Pulsatile Mass, Non 

Tender, Soft


Neurologic/Psychiatric:  Alert (Oriented 2 and improvement.), Normal Mood/

Affect, CNs II-XII Norm as Tested, Motor Weakness (Generalized no focal 

differences today other than slightly more weakness on plantar flexion on the 

right)


Skin:  Normal Color, Warm/Dry





Results/Procedures


Lab


Patient resulted labs reviewed.





Assessment/Plan


Assessment and Plan


Assess & Plan/Chief Complaint





1.  Altered mental status and right lower extremity weakness secondary to 

likely rather diffuse cerebral metastasis with significant mediastinal 

adenopathy left axillary adenopathy and right pathologic feeling lymph node 

under the external jugular vein.  After discussion with Dr. uDpont and Dr. Raymond he favors taking the left axillary node and we'll plan on doing so 

tomorrow.  Neurologic status has improved in regards to improve level of 

alertness and communication ability today.  No history of seizures or loss of 

consciousness reported by the patient..   for discussion is in agreement 

as well and will be seeing the patient.  In the interim we'll continue Decadron 

4 mg IV every 6 with dosage management per oncology.  The patient will likely 

require placement in a nursing care facility upon discharge.


2.  History of alcohol use disorder it is unclear as to whether or not there's 

been any recent drinking.  There is no history of any other alcohol related 

medical complications.  3.  Tobaccoism likely contributing to number 1 tissue 

diagnosis pending.


4.  Deconditioning secondary to number 1 we will obtain PT consultation in the 

morning.





Clinical Quality Measures


DVT/VTE Risk/Contraindication:


Risk Factor Score Per Nursin


RFS Level Per Nursing on Admit:  4+=Very High











ERIC YADAV MD Dec 2, 2018 11:44

## 2018-12-02 NOTE — PROGRESS NOTE (SOAP)
Subjective


Date Seen by a Provider:  Dec 2, 2018


Time Seen by a Provider:  12:47


Subjective/Events-last exam


Mental status improved. Able to interact and answer questions appropriately. On 

IV steroids


Review of Systems


General:  No Chills, No Night Sweats, No Fatigue, No Malaise


HEENT:  No Head Aches, No Eye Pain, No Ear Pain, No Dysphasia, No Sinus 

Congestion, No Post Nasal Drip, No Sore Throat


Pulmonary:  Cough


Cardiovascular:  No: Chest Pain, Palpitations, Orthopnea, Paroxysmal Noc. 

Dyspnea, Edema, Lt Headedness


Gastrointestinal:  No: Nausea, Vomiting, Abdominal Pain, Diarrhea, Constipation

, Melena, Hematochezia


Genitourinary:  No Dysuria, No Frequency, No Incontinence, No Hematuria, No 

Retention


Musculoskeletal:  No: other, neck pain, shoulder pain, arm pain, back pain, 

hand pain, leg pain, foot pain


Neurological:  Weakness





Objective


Exam





Vital Signs








  Date Time  Temp Pulse Resp B/P (MAP) Pulse Ox O2 Delivery O2 Flow Rate FiO2


 


18 08:00      Room Air  


 


18 00:00 98.0 63 18 100/64 (76) 94 Room Air  


 


18 20:00      Room Air  


 


18 19:35 97.5 70 16 131/75 (93) 97 Room Air  


 


18 15:40 97.5 71 16 103/72 (82) 96 Room Air  














I & O 


 


 18





 07:00


 


Intake Total 2802 ml


 


Balance 2802 ml





Capillary Refill : Less Than 3 Seconds


General Appearance:  No Apparent Distress


Neck:  Normal Inspection


Respiratory:  Decreased Breath Sounds


Cardiovascular:  Regular Rate, Rhythm


Gastrointestinal:  non tender, soft


Extremity:  Normal Inspection


Neurologic/Psychiatric:  Alert


Skin:  Warm/Dry


Other comments


Palpable lymph nodes over the left axilla and right jugulo-digastric area.





Assessment/Plan


Assessment/Plan


Assess & Plan/Chief Complaint


gentleman with diffuse metastatic lesion including cerebral lesions.  Primary 

lung carcinoma suspected.  It is reasonable to perform biopsy of the left 

axillary lymph node to establish a definitive diagnosis.  This is being 

scheduled for Monday, 3 December.





Generalized lymphadenopathy with cerebral metastases. For left axillary lymph 

node biopsy tomorrow


Final Diagnosis


Generalized lymphadenopathy with cerebral metastasis





Clinical Quality Measures


DVT/VTE Risk/Contraindication:


Risk Factor Score Per Nursin


RFS Level Per Nursing on Admit:  4+=Very High











USAMA WHITE MD Dec 2, 2018 12:50

## 2018-12-03 VITALS — DIASTOLIC BLOOD PRESSURE: 77 MMHG | SYSTOLIC BLOOD PRESSURE: 132 MMHG

## 2018-12-03 VITALS — DIASTOLIC BLOOD PRESSURE: 76 MMHG | SYSTOLIC BLOOD PRESSURE: 134 MMHG

## 2018-12-03 VITALS — SYSTOLIC BLOOD PRESSURE: 115 MMHG | DIASTOLIC BLOOD PRESSURE: 78 MMHG

## 2018-12-03 VITALS — SYSTOLIC BLOOD PRESSURE: 114 MMHG | DIASTOLIC BLOOD PRESSURE: 74 MMHG

## 2018-12-03 VITALS — DIASTOLIC BLOOD PRESSURE: 69 MMHG | SYSTOLIC BLOOD PRESSURE: 124 MMHG

## 2018-12-03 VITALS — SYSTOLIC BLOOD PRESSURE: 111 MMHG | DIASTOLIC BLOOD PRESSURE: 70 MMHG

## 2018-12-03 LAB
BUN/CREAT SERPL: 21
CALCIUM SERPL-MCNC: 9.5 MG/DL (ref 8.5–10.1)
CHLORIDE SERPL-SCNC: 106 MMOL/L (ref 98–107)
CO2 SERPL-SCNC: 22 MMOL/L (ref 21–32)
CREAT SERPL-MCNC: 0.94 MG/DL (ref 0.6–1.3)
GFR SERPLBLD BASED ON 1.73 SQ M-ARVRAT: > 60 ML/MIN
GLUCOSE SERPL-MCNC: 133 MG/DL (ref 70–105)
POTASSIUM SERPL-SCNC: 4.6 MMOL/L (ref 3.6–5)
SODIUM SERPL-SCNC: 139 MMOL/L (ref 135–145)

## 2018-12-03 PROCEDURE — 07B60ZX EXCISION OF LEFT AXILLARY LYMPHATIC, OPEN APPROACH, DIAGNOSTIC: ICD-10-PCS | Performed by: SURGERY

## 2018-12-03 RX ADMIN — PANTOPRAZOLE SODIUM SCH MG: 40 TABLET, DELAYED RELEASE ORAL at 06:10

## 2018-12-03 RX ADMIN — SODIUM CHLORIDE ONE MLS/HR: 900 INJECTION INTRAVENOUS at 11:12

## 2018-12-03 RX ADMIN — Medication SCH ML: at 06:10

## 2018-12-03 RX ADMIN — Medication SCH ML: at 15:01

## 2018-12-03 RX ADMIN — SODIUM CHLORIDE ONE MLS/HR: 900 INJECTION INTRAVENOUS at 12:56

## 2018-12-03 RX ADMIN — DEXAMETHASONE SCH MG: 4 TABLET ORAL at 12:33

## 2018-12-03 RX ADMIN — Medication SCH ML: at 22:08

## 2018-12-03 RX ADMIN — DEXAMETHASONE SCH MG: 4 TABLET ORAL at 17:35

## 2018-12-03 RX ADMIN — DEXAMETHASONE SCH MG: 4 TABLET ORAL at 06:10

## 2018-12-03 NOTE — PHYSICAL THERAPY EVALUATION
PT Evaluation-General


Medical Diagnosis


Admission Date


2018 at 14:38


Medical Diagnosis:  Brain Lesions, AMS, Weakness, Falls


Onset Date:  Dec 3, 2018





Therapy Diagnosis


Therapy Diagnosis:  General weakness





Height/Weight


Height (Feet):  5


Height (Inches):  10.00


Weight (Pounds):  180


Weight (Ounces):  0.0





Precautions


Precautions/Isolations:  Fall Prevention, Standard Precautions





Weight Bear Status


Right Lower Extremity:  Right


Weight Bearing/Tolerated


Left Lower Extremity:  Left


Weight Bearing/Tolerated





Referral


Physician:  Raheem Pereyra MD


Reason for Referral:  Evaluation/Treatment





Medical History


Pertinent Medical History:  Dementia


Additional Medical History


Past alcohol abuse


Current History


Patient brought to ER by sister who says she can no longer take care of her 

brother since previous fall





Social History


Home:  Single Level


Current Living Status:  Other Family


Entry Into Home:  Stairs With Railing


PT Steps Into Home:  2


PT Steps Inside Home:  0





Prior/Core FIM


Prior Level of Function


Therapy Code Descriptions/Definitions 





Functional [place] Measure:


0=Not Assessed/NA        4=Minimal Assistance


1=Total Assistance        5=Supervision or Setup


2=Maximal Assistance  6=Modified [place]


3=Moderate Assistance 7=Complete [place]








Therapy Quality Codes:


6    Independent with activity with or without an assistive device


5    Patient requires set up or clean up by helper.  Patient completes activity

  by  themselves


4    Supervision or touching assist (CGA). [place] provide cues , steadying 

assist


3    The helper provides less than half the effort to complete the activity


2    The helper provides more than half the effort to complete the activity


1    Dependent.  The helper does all the effort to complete an activity 


7    Patient refused to complete or attempt activity


9    The patient did not perform the activity before the current illness or 

injury


88  Not attempted due to Medical conditions or safety concerns





Functional Abilities and Goals:


Independent: Patient completed the activities by him/herself, with or without 

an assistive device, with no assistance from a helper.


Needed Some Help: Patient needed partial assistance from another person to 

complete activities.


Dependent: A helper completed the activities for the patient. 


Unknown:


Not Applicable:


Bed Mobility:  6


Transfers (B,C,W/C) (FIM):  6


Gait:  2


Stairs:  5


Indoor Mobility (Ambulation):  Needed Some Help


Stairs:  Needed Some Help


Prior Devices Use:  None


Prior Device Use:  single point cane





PT Evaluation-Current


Subjective


Pt asleep in bed when PT arrived. Pt woke to PT greeting and agreed to PT 

evaluation.





Pain





   Numeric Pain Scale:  0-No Pain


   Location:  No Pain Reported





Objective


Patient Orientation:  Person, Confused, Mumbles


Problem Solving:  Fair





ROM/Strength


ROM Upper Extremities


WNL


ROM Lower Extremities


WNL


Strength Upper Extremities


WNL


Strength Lower Extremities


4/5 BLE





Integumentary/Posture


Bowel Incontinence:  No


Bladder Incontinence:  No





Neuromuscular


(Tone, Coordination, Reflexes)


Gross motor coordination intact





Sensory


Vision:  Functional


Hearing:  Functional


Sensation Right Lower Extremit:  Impaired


Sensation Left Lower Extremity:  Impaired





Transfers


Therapy Code Descriptions/Definitions 





Functional [place] Measure:


0=Not Assessed/NA        4=Minimal Assistance


1=Total Assistance        5=Supervision or Setup


2=Maximal Assistance  6=Modified [place]


3=Moderate Assistance 7=Complete [place]


Transfers (B, C, W/C) (FIM):  4


Scootin


Rollin


Supine to/from Sit:  5


Sit to/from Stand:  4





Gait


Mode of Locomotion:  Walk


Anticipated Mode of Locomotion:  Walk


Gait (FIM):  1


Distance (FIM):  1=up to 49 ft


Distance:  15'


Gait Level of Assist:  4


Gait Persons Needed:  1


Gait Assistive Device:  FWW


Comments/Gait Description


Patient has difficulty with ambulation due to R calcaneus being inverted. PT 

tried to asses ROM of hind foot and found that calcaneus does not move. Pt 

reported foot has been like this since he was 42 years old.





Balance


Sitting Static:  Good


Sitting Dynamic:  Good


Standing Static:  Fair


 Standing Dynamic:  Fair





Assessment/Needs


Pt has 4/5 BLE gross motor strength. Pt is CGA assist when standing and during 

ambulation. Patient is able to ambulate for 15' with a FWW but balance is 

impacted by inversion of R hindfoot. Pt will continue PT to maintain current 

level of function.


Rehab Potential:  Fair





PT Long Term Goals


Long Term Goals


PT Long Term Goals Time Frame:  Dec 10, 2018


Transfers (B,C,W/C) (FIM):  6


Gait (FIM):  5


Gait distance (FIM):  8=514-22 ft


Distance:  50'


Gait Level of Assist:  5


Gait Assistive Device:  FWW





PT Plan


Problem List


Problem List:  Activity Tolerance, Functional Strength, Safety, Balance, Gait, 

Transfer, Bed Mobility





Treatment/Plan


Treatment Plan:  Continue Plan of Care


Treatment Plan:  Bed Mobility, Education, Functional Activity [place], Functional 

Strength, Gait, Safety, Therapeutic Exercise, Transfers


Treatment Duration:  Dec 10, 2018


Frequency:  6 times per week


Estimated Hrs Per Day:  .25 hour per day


Patient and/or Family Agrees t:  Yes





Discharge Recommendations


Therapy D/C Recommendations:  Nursing Home Placement





Time/GCodes


Time In:  818


Time Out:  842


Total Billed Treatment Time:  24


Total Billed Treatment


1 Visit


EVModC - 24'


G Codes Necessary:  No











FELECIA DU PT Dec 3, 2018 09:15

## 2018-12-03 NOTE — ONCOLOGY PROGRESS NOTE
Subjective


Time Seen by a Provider:  16:58


Subjective/Events-last exam


Pt appeared less confusion today. No new complaints. Eating good meals. 


Pt had tissue biopsy today.


I have notified Rad/Onc Dr Durand today for consultation.





Data Review


Labs


Laboratory Tests


12/3/18 05:25








Laboratory Tests


12/3/18 05:25: 


Blood Urea Nitrogen 20H, Glucose Level 133H








Radiology


CT of the Head 18 w w/o





There are numerous circumscribed hyperdense lesions throughout


bilateral cerebral and cerebellar hemispheres. There is also a


mass located within the khoi. The largest lesion is in the left


frontal lobe measuring 2 cm in diameter. The lesions range from a


7 mm to 20 mm. There is a moderate amount of perilesional edema


present. No midline shift is seen. No hydrocephalus is


identified.





IMPRESSION: Innumerable rounded hyperdense lesions throughout


bilateral cerebral and cerebellar hemispheres with surrounding


vasogenic edema. Features are most suggestive of widespread


intracranial metastatic disease.








NAME:      ALECIA FRANCES


MED REC#:   [id number]


ACCOUNT#:   [id number]


PHYSICIAN:    ERIC YADAV MD


 











Date of Exam:   18





CT CHEST/ABDOMEN/PELVIS W


 





PROCEDURE: CT chest, abdomen, and pelvis with contrast.





TECHNIQUE: Multiple contiguous axial images were obtained through


the chest, abdomen, and pelvis after the administration of


intravenous contrast.





INDICATION: Brain lesions, altered mental status, cough.





FINDINGS:





Chest:





There is extensive thoracic lymphadenopathy, most suggestive of


widespread metastatic disease. Abnormal ovoid axillary nodes on


the left measured a maximal diameter of 2.9 cm. Right paramedian


prevascular anterior mediastinal orly mass is 2.7 cm. There is a


large aggregate confluent right superior paratracheal orly mass


displacing the junction of the innominate bones and SVC


anteriorly without venous obstruction. At the level of the


junction of the innominates, that mass measures 6.4 x 4.5 cm and


deviates the contour of the adjacent right lateral tracheal wall.


Its caudal extent of the mass is contiguous with right lower


paratracheal mediastinal adenopathy as well as contiguous with


right hilar adenopathy. The mass in aggregate at the level of the


torin is measuring 7.8 x 4.8 cm. There is subcarinal


lymphadenopathy. The largest subcarinal mass measuring 4.1 cm.


There is paraesophageal adenopathy measuring 2 cm. A right


superior pulmonary hilar node measures 2 cm. The left lower lobe


lung mass is likely metastatic measuring 1 cm. Subpleural mass in


the right lower lobe measured 8 mm. Right basilar pulmonary mass


measures 19 mm.





Abdomen and pelvis:





There are findings of widespread multifocal hepatic metastatic


disease involving left and right lobes. The largest right lobe


mass is 4.3 cm. A mass in the caudate lobe is 2.8 cm. Largest


left lobe mass is 2 cm. Thicker marker bilateral adrenal masses,


presumptively metastatic. The larger on the right measured 4 x


2.6 cm. There is upper abdominal mesenteric lymphadenopathy. The


largest bilobed lesion is 3.7 cm long axis. Pancreas itself


appeared unremarkable. There is no bile duct dilatation. The


unobstructed kidneys were normal. There is splenic granulomata,


benign and incidental, as well as adjacent splenules noted


incidentally. A mass lateral to the upper pole of the left kidney


and distal to the pancreatic tail is 1.5 cm, presumed metastatic


deposit. There is unruptured atherosclerotic irregular infrarenal


abdominal aortic aneurysm measuring 4.5 x 4.0 cm in transverse


dimensions. The aneurysm extends a cephalocaudal length of about


5 cm. It terminates prior to the aortic bifurcation and the


common internal and external iliac branches are calcified but


patent and nonaneurysmal. There is colonic constipation and


extensive sigmoid diverticulosis without features of acute


diverticulitis. The appendix is normal. Colon stool load, its


tortuosity, and its diverticulation is limited in its evaluation.


No identifiable colonic mass was found. Urinary bladder appeared


unremarkable. There was no suspicious lytic or sclerotic bony


lesion.





IMPRESSION:


1. A metastatic pattern in the chest with hilar, axillary, and


mediastinal lymphadenopathy. Multiple lung masses, likely


metastatic. Background COPD and interstitial lung disease,


chronic. No thoracic effusion or chest wall lesion evident


2. Abdomen and pelvis: Widespread metastatic disease to the


liver, abdominal lymph nodes, and adrenal glands. Diverticulated


and constipated colon appeared otherwise nonfocal. Unruptured


atherosclerotic infrarenal abdominal aortic aneurysm.





Dictated by: 





  Dictated on workstation # [id number]





[id number]





Dict:      18


Trans:      18





Interpreted by:         GIOVANNI FRAUSTO


Electronically signed by:   GIOVANNI FRAUSTO [id number]





Physical Exam


Vital Signs





Vital Signs - First Documented








 11/30/18 11/30/18 11/30/18





 11:48 15:22 15:30


 


Temp 96.2  


 


Pulse 87  


 


Resp 18  


 


B/P (MAP) 112/83 (93)  


 


Pulse Ox  99 


 


O2 Delivery   Room Air





Capillary Refill : Less Than 3 Seconds


Height, Weight, BMI


Height: 5'10.00"


Weight: 180lbs. 0.0oz. [id number]; 25.8 BMI


Method:Estimated


General Appearance:  No Apparent Distress


HEENT:  PERRL/EOMI


Neck:  Non Tender, Supple


Respiratory:  Lungs Clear, No Accessory Muscle Use, No Respiratory Distress


Cardiovascular:  Regular Rate, Rhythm


Gastrointestinal:  Non Tender, Soft


Extremity:  Non Tender, No Calf Tenderness, No Pedal Edema


Neurologic/Psychiatric:  Disoriented





Impression & Plan


Impression & Plan


IMP:


1. Multiple brain lesions with surrounding edema, symptoms of confusion and 

general weakness. 


2. Multiple lung lesions, adrenal and liver lesions, mostly carcinoma of lung 

origin with extensive liver mets and adrenal mets. 


3. H/o alcoholism


4. COPD


5. His sister is not able to take care of him at home any more.


Plan:


1. F/u Tissue biopsy path report. Most likely diagnosis, carcinoma of the lung 

origin. 


2. Consult Rad/Onc Dr. Durand was notified today.


3. Continue Decadron 4mg po qid until 2-3 days after cranial radiation then 

gradually taper. 


4. Pepcid or PI while on Decadron


5.  consult.  


6. Physical therapy





Clinical Quality Measures


DVT/VTE Risk/Contraindication:


Risk Factor Score Per Nursin


RFS Level Per Nursing on Admit:  4+=Very High











WHITLEY BLOCK MD Dec 3, 2018 17:02

## 2018-12-03 NOTE — PROGRESS NOTE-HOSPITALIST
Subjective


HPI/CC On Admission


Date Seen by Provider:  Dec 3, 2018


Time Seen by Provider:  09:28


Mr. Colon is a 68-year-old white male who had been staying with his sister who 

brought him into the emergency room the evening of  reporting she 

could no longer care for him.  According to the emergency room physician she 

was quite small and frail herself and stated that he had been increasingly 

confused over the past 1-2 weeks had become generally weak despite a reasonable 

appetite and it had several falls.  There have been no reported loss of 

consciousness or head trauma.  He was pleasant upon my arrival voicing no 

complaints but thought he was in the [facility] and did not know why he 

was here.  All he could tell me was that things got worse suddenly but did not 

elaborate.  He did begin sentences and then stop in mid sentence and continue 

to eat.  He denied any problems with headaches vision change on the reporting 

generalized weakness.  He denied any pain and denies any focal areas of 

weakness or numbness.  He reported no problems with bowel or bladder control 

although his history is somewhat suspect.  He also denied night sweats chills 

fever or chest pain cough or hemoptysis.


Subjective/Events-last exam


Pt is confused. When asked about biopsy he is unsure where or why that is being 

done.





Objective


Exam


Vital Signs





Vital Signs








  Date Time  Temp Pulse Resp B/P (MAP) Pulse Ox O2 Delivery O2 Flow Rate FiO2


 


12/3/18 12:00 97.1 60 16 115/78 (90) 94 Room Air  





Capillary Refill : Less Than 3 Seconds


General Appearance:  No Apparent Distress, WD/WN


Respiratory:  Lungs Clear, No Respiratory Distress


Cardiovascular:  Regular Rate, Rhythm, No Murmur


Gastrointestinal:  Normal Bowel Sounds, Soft


Neurologic/Psychiatric:  Alert, Other (Oriented to person and place)





Results/Procedures


Lab


Laboratory Tests


12/3/18 05:25








Patient resulted labs reviewed.





Assessment/Plan


Assessment and Plan


Assess & Plan/Chief Complaint


1.  Altered mental status and right lower extremity weakness- likely due to 

diffuse cerebral metastasis with significant diffuse lymphadenopathy. Plan for 

biopsy today by Dr Raymond. Palliative Care Consulted.


2.  Diffuse cerebral tumors- likely metastatic disease. Imaging reveals masses 

in brain, lungs, mediastinum, and all throughout abdomen- Continue Decadron


3.  History of alcohol use disorder- CIWA assess ordered


4.  Tobaccoism likely contributing to number 1 tissue diagnosis pending.


5.  Deconditioning secondary to number 1 - PT ordered





Diagnosis/Problems


Diagnosis/Problems





(1) Cancer with unknown primary site


(2) Brain lesion


Status:  Acute


(3) Weakness


Status:  Acute


(4) Cerebral edema


Status:  Acute





Clinical Quality Measures


DVT/VTE Risk/Contraindication:


Risk Factor Score Per Nursin


RFS Level Per Nursing on Admit:  4+=Very High











TODD GARCIA MD Dec 3, 2018 09:24

## 2018-12-03 NOTE — OPERATIVE REPORT
Operative Report


Date of Procedure/Surgery


Dec 3, 2018


Surgeon (s)


USAMA WHITE MD


Assistant (s):  N/A





Post-Operative Diagnosis





same





Procedure Performed





excision biopsy of left axillary lymph node





Description of Procedure


Anesthesia Type:  General


Estimated blood loss (mL):  Minimal


Specimen(s) collected/removed


L axillary node


Description of the Procedure


Indication for the procedure: This gentleman has been admitted with mental 

status changes with the findings of widespread, bilateral cerebral metastatic 

lesions and diffuse lymphadenopathy.  To establish a definitive histologic 

diagnosis, performing an excision biopsy of a large, palpable left axillary 

lymph node was felt to be reasonable.





Informed consent was obtained after reviewing the details of the procedure and 

complications of hematoma, wound infection etc.





Description of the procedure: She was placed supine on the operative table and 

general anesthesia induced.  A gram of Ancef was administered intravenously as 

prophylaxis against wound infection.  Sequential compression devices were 

placed around his legs, to minimize the risk of venous thrombosis.





Left axilla was prepared and draped in the usual sterile manner.  Preemptive 

analgesia was established using 0.5 percent Marcaine with epinephrine.  A 4 cm 

vertical incision was made along the skin crease inferior to the axillary 

hairline and a large lymph node isolated.  Lymphatics were controlled using 

ligaclips and the lymph node was excised intact.  It was sent fresh for 

analysis. Hemostasis was achieved using cautery and ligaclips.  The area was 

irrigated with saline and the incision closed using 3-0 Vicryl for the 

subcutaneous tissue and 4-0 Vicryl for skin, in a subcuticular fashion.





He tolerated the procedure well, was extubated in the operating room and taken 

to the recovery room in a stable condition


Findings of the Procedure


See op report





Allergies and Home Medications


Allergies


Coded Allergies:  


     No Known Drug Allergies (Unverified , 7/21/12)





Home Medications


No Active Prescriptions or Reported Meds





Patient Home Medication List


Home Medication List Reviewed:  Yes





Copy


Copies To 1:   ERIC YADAV MD


Copies To 2:   WHITLEY BLOCK MD, XAVIER M MD Dec 3, 2018 13:48

## 2018-12-03 NOTE — ANESTHESIA-GENERAL POST-OP
General


Patient Condition


Mental Status/LOC:  Same as Preop


Cardiovascular:  Satisfactory


Nausea/Vomiting:  Absent


Respiratory:  Satisfactory


Pain:  Controlled


Complications:  Absent





Post Op Complications


Complications


None





Follow Up Care/Instructions


Patient Instructions


None needed.





Anesthesia/Patient Condition


Patient Condition


Patient is doing well, no complaints, stable vital signs, no apparent adverse 

anesthesia problems.











IMELDA LOYA DO Dec 3, 2018 14:39

## 2018-12-04 VITALS — DIASTOLIC BLOOD PRESSURE: 76 MMHG | SYSTOLIC BLOOD PRESSURE: 124 MMHG

## 2018-12-04 VITALS — SYSTOLIC BLOOD PRESSURE: 121 MMHG | DIASTOLIC BLOOD PRESSURE: 72 MMHG

## 2018-12-04 VITALS — DIASTOLIC BLOOD PRESSURE: 60 MMHG | SYSTOLIC BLOOD PRESSURE: 117 MMHG

## 2018-12-04 VITALS — SYSTOLIC BLOOD PRESSURE: 102 MMHG | DIASTOLIC BLOOD PRESSURE: 58 MMHG

## 2018-12-04 VITALS — DIASTOLIC BLOOD PRESSURE: 81 MMHG | SYSTOLIC BLOOD PRESSURE: 121 MMHG

## 2018-12-04 VITALS — SYSTOLIC BLOOD PRESSURE: 115 MMHG | DIASTOLIC BLOOD PRESSURE: 62 MMHG

## 2018-12-04 VITALS — SYSTOLIC BLOOD PRESSURE: 125 MMHG | DIASTOLIC BLOOD PRESSURE: 76 MMHG

## 2018-12-04 RX ADMIN — Medication SCH ML: at 14:55

## 2018-12-04 RX ADMIN — DEXAMETHASONE SCH MG: 4 TABLET ORAL at 17:29

## 2018-12-04 RX ADMIN — DEXAMETHASONE SCH MG: 4 TABLET ORAL at 23:00

## 2018-12-04 RX ADMIN — DEXAMETHASONE SCH MG: 4 TABLET ORAL at 00:11

## 2018-12-04 RX ADMIN — DEXAMETHASONE SCH MG: 4 TABLET ORAL at 11:33

## 2018-12-04 RX ADMIN — DEXAMETHASONE SCH MG: 4 TABLET ORAL at 06:00

## 2018-12-04 RX ADMIN — PANTOPRAZOLE SODIUM SCH MG: 40 TABLET, DELAYED RELEASE ORAL at 06:00

## 2018-12-04 RX ADMIN — Medication SCH ML: at 06:00

## 2018-12-04 RX ADMIN — Medication SCH ML: at 20:22

## 2018-12-04 NOTE — PHYSICAL THERAPY DAILY NOTE
PT Daily Note-Current


Subjective


Pt awake in bed visiting with family when PT arrived. Patient agreed to get up 

and walk after encouragement.





Pain





   Numeric Pain Scale:  0-No Pain


   Location:  No Pain Reported





Mental Status


Patient Orientation:  Confused





Transfers


Therapy Code Descriptions/Definitions 





Functional [place] Measure:


0=Not Assessed/NA        4=Minimal Assistance


1=Total Assistance        5=Supervision or Setup


2=Maximal Assistance  6=Modified [place]


3=Moderate Assistance 7=Complete [place]








Therapy Quality Codes:


6    Independent with activity with or without an assistive device


5    Patient requires set up or clean up by helper.  Patient completes activity

  by  themselves


4    Supervision or touching assist (CGA). [place] provide cues , steadying 

assist


3    The helper provides less than half the effort to complete the activity


2    The helper provides more than half the effort to complete the activity


1    Dependent.  The helper does all the effort to complete an activity 


7    Patient refused to complete or attempt activity


9    The patient did not perform the activity before the current illness or 

injury


88  Not attempted due to Medical conditions or safety concerns


Transfers (B, C, W/C) (FIM):  4


Scootin


Rollin


Supine to/from Sit:  4


Sit to/from Stand:  4





Weight Bearing


Right Lower Extremity:  Right


Weight Bearing/Tolerated


Left Lower Extremity:  Left


Weight Bearing/Tolerated





Gait Training


Gait (FIM):  2


Distance (FIM):  1=188-04 ft


Distance:  100'


Gait Level of Assist:  4


Gait Persons Needed:  1


Gait Assistive Device:  FWW





Assessment


Patient is able to ambulate with a FWW requiring CGA. Patient loses balance 

easily when turning and needs to be guarded closely by therapist. Patient does 

not follow direction and needs to be cued multiple times. Patient returned to 

bed with all four guard rails up and bed alarm turned on.





PT Short Term Goals


Short Term Goals


Time Frame:  Dec 4, 2018





PT Long Term Goals


Long Term Goals


PT Long Term Goals Time Frame:  Dec 10, 2018


Transfers (B,C,W/C) (FIM):  6


Gait (FIM):  5


Gait distance (FIM):  4=303-89 ft


Distance:  50'


Gait Level of Assist:  5


Gait Assistive Device:  FWW





PT Plan


Problem List


Problem List:  Activity Tolerance, Functional Strength, Safety, Balance, Gait, 

Transfer, Bed Mobility, ROM





Treatment/Plan


Treatment Plan:  Continue Plan of Care


Treatment Plan:  Bed Mobility, Education, Functional Activity [place], Functional 

Strength, Gait, Safety, Therapeutic Exercise, Transfers


Treatment Duration:  Dec 10, 2018


Frequency:  6 times per week


Estimated Hrs Per Day:  .25 hour per day


Patient and/or Family Agrees t:  Yes





Time/GCodes


Time In:  1308


Time Out:  1320


Total Billed Treatment Time:  12


Total Billed Treatment


1 Visit


GT - 12'











FELECIA DU PT Dec 4, 2018 13:55

## 2018-12-04 NOTE — ONCOLOGY PROGRESS NOTE
Subjective


Date Seen by a Provider:  Dec 4, 2018


Time Seen by a Provider:  15:29


Subjective/Events-last exam


Biopsy of left submandibular node by Dr Raymond showed no malignancy. 


Pt will need to better precise approach and CT guided biopsy for diagnosis.


I have discussed with Radiologist about the bronch of medium sternum mass vs CT 

guided biopsy of liver and left axillary node. I felt the needle biopsy of the 

liver mets and left axillary node under CT was the most informative and less 

invasive. 


PT will be NPO post mid-night and CT guided biopsy tomorrow AM.





Data Review


Labs


Laboratory Tests


12/3/18 05:25: 


Blood Urea Nitrogen 20H, Glucose Level 133H








Radiology


CT of the Head 18 w w/o





There are numerous circumscribed hyperdense lesions throughout


bilateral cerebral and cerebellar hemispheres. There is also a


mass located within the khoi. The largest lesion is in the left


frontal lobe measuring 2 cm in diameter. The lesions range from a


7 mm to 20 mm. There is a moderate amount of perilesional edema


present. No midline shift is seen. No hydrocephalus is


identified.





IMPRESSION: Innumerable rounded hyperdense lesions throughout


bilateral cerebral and cerebellar hemispheres with surrounding


vasogenic edema. Features are most suggestive of widespread


intracranial metastatic disease.








NAME:      ALECIA FRANCES


MED REC#:   [id number]


ACCOUNT#:   [id number]


PHYSICIAN:    ERIC YADAV MD


 











Date of Exam:   18





CT CHEST/ABDOMEN/PELVIS W


 





PROCEDURE: CT chest, abdomen, and pelvis with contrast.





TECHNIQUE: Multiple contiguous axial images were obtained through


the chest, abdomen, and pelvis after the administration of


intravenous contrast.





INDICATION: Brain lesions, altered mental status, cough.





FINDINGS:





Chest:





There is extensive thoracic lymphadenopathy, most suggestive of


widespread metastatic disease. Abnormal ovoid axillary nodes on


the left measured a maximal diameter of 2.9 cm. Right paramedian


prevascular anterior mediastinal orly mass is 2.7 cm. There is a


large aggregate confluent right superior paratracheal orly mass


displacing the junction of the innominate bones and SVC


anteriorly without venous obstruction. At the level of the


junction of the innominates, that mass measures 6.4 x 4.5 cm and


deviates the contour of the adjacent right lateral tracheal wall.


Its caudal extent of the mass is contiguous with right lower


paratracheal mediastinal adenopathy as well as contiguous with


right hilar adenopathy. The mass in aggregate at the level of the


torin is measuring 7.8 x 4.8 cm. There is subcarinal


lymphadenopathy. The largest subcarinal mass measuring 4.1 cm.


There is paraesophageal adenopathy measuring 2 cm. A right


superior pulmonary hilar node measures 2 cm. The left lower lobe


lung mass is likely metastatic measuring 1 cm. Subpleural mass in


the right lower lobe measured 8 mm. Right basilar pulmonary mass


measures 19 mm.





Abdomen and pelvis:





There are findings of widespread multifocal hepatic metastatic


disease involving left and right lobes. The largest right lobe


mass is 4.3 cm. A mass in the caudate lobe is 2.8 cm. Largest


left lobe mass is 2 cm. Thicker marker bilateral adrenal masses,


presumptively metastatic. The larger on the right measured 4 x


2.6 cm. There is upper abdominal mesenteric lymphadenopathy. The


largest bilobed lesion is 3.7 cm long axis. Pancreas itself


appeared unremarkable. There is no bile duct dilatation. The


unobstructed kidneys were normal. There is splenic granulomata,


benign and incidental, as well as adjacent splenules noted


incidentally. A mass lateral to the upper pole of the left kidney


and distal to the pancreatic tail is 1.5 cm, presumed metastatic


deposit. There is unruptured atherosclerotic irregular infrarenal


abdominal aortic aneurysm measuring 4.5 x 4.0 cm in transverse


dimensions. The aneurysm extends a cephalocaudal length of about


5 cm. It terminates prior to the aortic bifurcation and the


common internal and external iliac branches are calcified but


patent and nonaneurysmal. There is colonic constipation and


extensive sigmoid diverticulosis without features of acute


diverticulitis. The appendix is normal. Colon stool load, its


tortuosity, and its diverticulation is limited in its evaluation.


No identifiable colonic mass was found. Urinary bladder appeared


unremarkable. There was no suspicious lytic or sclerotic bony


lesion.





IMPRESSION:


1. A metastatic pattern in the chest with hilar, axillary, and


mediastinal lymphadenopathy. Multiple lung masses, likely


metastatic. Background COPD and interstitial lung disease,


chronic. No thoracic effusion or chest wall lesion evident


2. Abdomen and pelvis: Widespread metastatic disease to the


liver, abdominal lymph nodes, and adrenal glands. Diverticulated


and constipated colon appeared otherwise nonfocal. Unruptured


atherosclerotic infrarenal abdominal aortic aneurysm.





Dictated by: 





  Dictated on workstation # [id number]





[id number]





Dict:      18


Trans:      18





Interpreted by:         GIOVANNI FRAUSTO


Electronically signed by:   GIOVANNI FRAUSTO 18





Physical Exam


Vital Signs





Vital Signs - First Documented








 18





 11:48 15:22 15:30


 


Temp 96.2  


 


Pulse 87  


 


Resp 18  


 


B/P (MAP) 112/83 (93)  


 


Pulse Ox  99 


 


O2 Delivery   Room Air





Capillary Refill : Less Than 3 Seconds


Height, Weight, BMI


Height: 5'10.00"


Weight: 180lbs. 0.0oz. 81.841065vg; 25.8 BMI


Method:Estimated


General Appearance:  No Apparent Distress


Neck:  Non Tender, Supple


Respiratory:  No Accessory Muscle Use, No Respiratory Distress


Gastrointestinal:  Non Tender, Soft


Extremity:  Non Tender, No Calf Tenderness, No Pedal Edema


Neurologic/Psychiatric:  Disoriented





Impression & Plan


Impression & Plan


IMP:


1. Multiple brain lesions with surrounding edema, symptoms of confusion and 

general weakness. 


2. Multiple lung lesions, adrenal and liver lesions, mostly carcinoma of lung 

origin with extensive liver mets and adrenal mets. 


3. H/o alcoholism


4. COPD


5. His sister is not able to take care of him at home any more.


Plan:


1. Needle biopsy of left axillary node and liver mets under CT guidance.  Most 

likely diagnosis, carcinoma of the lung origin. 


2. Consult Rad/Onc Dr. Durand was notified.


3. Continue Decadron 4mg po qid until 2-3 days after cranial radiation then 

gradually taper. 


4. Pepcid or PI while on Decadron


5.  consult.  


6. Physical therapy





Clinical Quality Measures


DVT/VTE Risk/Contraindication:


Risk Factor Score Per Nursin


RFS Level Per Nursing on Admit:  4+=Very High











WHITLEY BLOCK MD Dec 4, 2018 15:38

## 2018-12-04 NOTE — PROGRESS NOTE-HOSPITALIST
Subjective


HPI/CC On Admission


Date Seen by Provider:  Dec 4, 2018


Time Seen by Provider:  10:04


Mr. Colon is a 68-year-old white male who had been staying with his sister who 

brought him into the emergency room the evening of  reporting she 

could no longer care for him.  According to the emergency room physician she 

was quite small and frail herself and stated that he had been increasingly 

confused over the past 1-2 weeks had become generally weak despite a reasonable 

appetite and it had several falls.  There have been no reported loss of 

consciousness or head trauma.  He was pleasant upon my arrival voicing no 

complaints but thought he was in the [facility] and did not know why he 

was here.  All he could tell me was that things got worse suddenly but did not 

elaborate.  He did begin sentences and then stop in mid sentence and continue 

to eat.  He denied any problems with headaches vision change on the reporting 

generalized weakness.  He denied any pain and denies any focal areas of 

weakness or numbness.  He reported no problems with bowel or bladder control 

although his history is somewhat suspect.  He also denied night sweats chills 

fever or chest pain cough or hemoptysis.


Subjective/Events-last exam


Pt is lying in bed confused. When asked where he lives he stated "right here." 

He denied any other complaints.





Objective


Exam


Vital Signs





Vital Signs








  Date Time  Temp Pulse Resp B/P (MAP) Pulse Ox O2 Delivery O2 Flow Rate FiO2


 


18 08:00 96.9 60 20 102/58 (73) 96 Room Air  





Capillary Refill : Less Than 3 Seconds


General Appearance:  No Apparent Distress, WD/WN


Respiratory:  Lungs Clear, No Respiratory Distress


Cardiovascular:  Regular Rate, Rhythm, No Murmur


Gastrointestinal:  Normal Bowel Sounds, Non Tender, Soft


Neurologic/Psychiatric:  Alert, Disoriented





Results/Procedures


Lab


Patient resulted labs reviewed.





Assessment/Plan


Assessment and Plan


Assess & Plan/Chief Complaint


1.  Altered mental status and right lower extremity weakness- likely due to 

diffuse cerebral metastasis with significant diffuse lymphadenopathy. Underwent 

biopsy yesterday. Await path. Would likely benefit from palliative care


2.  Diffuse cerebral tumors- likely metastatic disease. Imaging reveals masses 

in brain, lungs, mediastinum, and all throughout abdomen- Continue Decadron. 

Await path from bx yesterday


3.  History of alcohol use disorder- CIWA assess ordered


4.  Tobaccoism likely contributing to number 1 tissue diagnosis pending.


5.  Deconditioning secondary to number 1 - PT ordered, will need nursing home 

placement





Diagnosis/Problems


Diagnosis/Problems





(1) Cancer with unknown primary site


(2) Brain lesion


Status:  Acute


(3) Weakness


Status:  Acute


(4) Cerebral edema


Status:  Acute





Clinical Quality Measures


DVT/VTE Risk/Contraindication:


Risk Factor Score Per Nursin


RFS Level Per Nursing on Admit:  4+=Very High











TODD GARCIA MD Dec 4, 2018 10:07

## 2018-12-04 NOTE — PHYSICAL THERAPY PROGRESS NOTE
Therapy Progress Note


Pt refused to get up and walk with PT. PT asked if he would prefer exercises in 

bed but stated he did not want any PT.











FELECIA DU PT Dec 4, 2018 10:42

## 2018-12-05 VITALS — DIASTOLIC BLOOD PRESSURE: 85 MMHG | SYSTOLIC BLOOD PRESSURE: 134 MMHG

## 2018-12-05 VITALS — DIASTOLIC BLOOD PRESSURE: 79 MMHG | SYSTOLIC BLOOD PRESSURE: 122 MMHG

## 2018-12-05 VITALS — SYSTOLIC BLOOD PRESSURE: 111 MMHG | DIASTOLIC BLOOD PRESSURE: 74 MMHG

## 2018-12-05 VITALS — DIASTOLIC BLOOD PRESSURE: 75 MMHG | SYSTOLIC BLOOD PRESSURE: 124 MMHG

## 2018-12-05 VITALS — SYSTOLIC BLOOD PRESSURE: 119 MMHG | DIASTOLIC BLOOD PRESSURE: 76 MMHG

## 2018-12-05 VITALS — DIASTOLIC BLOOD PRESSURE: 76 MMHG | SYSTOLIC BLOOD PRESSURE: 119 MMHG

## 2018-12-05 RX ADMIN — PANTOPRAZOLE SODIUM SCH MG: 40 TABLET, DELAYED RELEASE ORAL at 05:56

## 2018-12-05 RX ADMIN — DEXAMETHASONE SCH MG: 4 TABLET ORAL at 05:55

## 2018-12-05 RX ADMIN — Medication SCH ML: at 14:26

## 2018-12-05 RX ADMIN — Medication SCH ML: at 05:55

## 2018-12-05 RX ADMIN — DEXAMETHASONE SCH MG: 4 TABLET ORAL at 18:29

## 2018-12-05 RX ADMIN — Medication SCH ML: at 22:00

## 2018-12-05 RX ADMIN — DEXAMETHASONE SCH MG: 4 TABLET ORAL at 14:26

## 2018-12-05 NOTE — PROGRESS NOTE (SOAP)
Subjective


Date Seen by Provider:  Dec 5, 2018


Time Seen by Provider:  09:30


Review of Systems


General:  No Chills, No Malaise


HEENT:  No Head Aches


Pulmonary:  Cough


Cardiovascular:  No: Chest Pain


Gastrointestinal:  No: Nausea, Vomiting


Neurological:  Weakness, Confusion





Objective


Exam


Last Set of Vital Signs





Vital Signs








  Date Time  Temp Pulse Resp B/P (MAP) Pulse Ox O2 Delivery O2 Flow Rate FiO2


 


18 11:50 96.9 58 16 111/74 (86) 98 Room Air  





Capillary Refill : Less Than 3 Seconds


I&O











Intake and Output 


 


 18





 00:00


 


Intake Total 3685 ml


 


Balance 3685 ml


 


 


 


Intake Oral 2485 ml


 


IV Total 1200 ml


 


# Voids 11


 


# Bowel Movements 3








General:  Alert, Other (disoriented x 3 - uable to verbalize correct name, date

, year or current president)


HEENT:  Atraumatic





Results


Lab





Microbiology


18 MRSA Screen - Final, Complete


          MRSA not isolated





Assessment/Plan


Assessment/Plan


Assess & Plan/Chief Complaint


-patient visited


-biopsy results remain pending- should be out tomorrow


-final treatment decision will be based on pathology (Small cell vs Non-small 

cell)


-have discussed case with Dr. Stone and medical oncologist, Dr. Mcclellan





Clinical Quality Measures


DVT/VTE Risk/Contraindication:


Risk Factor Score Per Nursin


RFS Level Per Nursing on Admit:  4+=Very High











MYERS,DUANE E MD Dec 5, 2018 15:30

## 2018-12-05 NOTE — PHYSICAL THERAPY DAILY NOTE
PT Daily Note-Current


Subjective


Pt reports doing ok today, states he doesn't understand why they won't let him 

eat, even after it was explained to him.





Pain





   Numeric Pain Scale:  0-No Pain


   Comment:  denies pain, reports only soreness all over





Appearance


Upon arrival into pt's room, pt awake and supine in bed with head elevated and 

bed alarm activated. 


at end of session, pt in recliner with LE's elevated and chair alarm activated. 

Nurse made aware. Call light and phone within reach and bedside table in front 

of pt. Pt requiring re instruction on use of nurse call light and not to get up 

on his own.





Mental Status


Patient Orientation:  Person, Eyes Open, Mumbles


pt could not answer what states or town he is in but was able to state that he 

is in a hospital.





Transfers


Therapy Code Descriptions/Definitions 





Functional [place] Measure:


0=Not Assessed/NA        4=Minimal Assistance


1=Total Assistance        5=Supervision or Setup


2=Maximal Assistance  6=Modified [place]


3=Moderate Assistance 7=Complete [place]








Therapy Quality Codes:


6    Independent with activity with or without an assistive device


5    Patient requires set up or clean up by helper.  Patient completes activity

  by  themselves


4    Supervision or touching assist (CGA). [place] provide cues , steadying 

assist


3    The helper provides less than half the effort to complete the activity


2    The helper provides more than half the effort to complete the activity


1    Dependent.  The helper does all the effort to complete an activity 


7    Patient refused to complete or attempt activity


9    The patient did not perform the activity before the current illness or 

injury


88  Not attempted due to Medical conditions or safety concerns


Transfers (B, C, W/C) (FIM):  4


Scootin


Rollin


Supine to/from Sit:  5


Sit to/from Stand:  4


Pt with slight unsteadiness requiring CGA and requiring verb inst and 

demonstration for safety with hand placement during all transitions. Noted R 

ankle inversion with pt stating it just started like that about 40 years ago 

and he just lives with it





Weight Bearing


Right Lower Extremity:  Right


Weight Bearing/Tolerated


Left Lower Extremity:  Left


Weight Bearing/Tolerated





Gait Training


Gait (FIM):  4


Distance (FIM):  3=150 ft


Distance:  340


Gait Level of Assist:  4


Gait Persons Needed:  1


Gait Assistive Device:  FWW


CGA required due to unsteadiness, slight LOB x3 episodes, noted R ankle 

inversion during gait, shuffling, slow, antalgic, occasional unsteadiness, 

assist to divert walker when slightly running into objects





Treatments


transfer and gait training





Assessment


Current Status:  Fair Progress


slight confusion, unsteady with gait and transfers, requiring constant verb 

inst for safety and hand placement during transitions





PT Short Term Goals


Short Term Goals


Time Frame:  Dec 4, 2018





PT Long Term Goals


Long Term Goals


PT Long Term Goals Time Frame:  Dec 10, 2018


Transfers (B,C,W/C) (FIM):  6


Gait (FIM):  5


Gait distance (FIM):  8=655-91 ft


Distance:  50'


Gait Level of Assist:  5


Gait Assistive Device:  FWW





PT Plan


Problem List


Problem List:  Activity Tolerance, Functional Strength, Safety, Balance, Gait, 

Transfer





Treatment/Plan


Treatment Plan:  Continue Plan of Care


Treatment Plan:  Bed Mobility, Education, Functional Activity [place], Functional 

Strength, Gait, Safety, Therapeutic Exercise, Transfers


Treatment Duration:  Dec 10, 2018


Frequency:  6 times per week


Estimated Hrs Per Day:  .25 hour per day


Patient and/or Family Agrees t:  Yes





Safety Risks/Education


Patient Education:  Gait Training, Transfer Techniques, Safety Issues


Teaching Recipient:  Patient


Teaching Methods:  Demonstration, Discussion


Response to Teaching:  Verbalize Understanding, Return Demonstration, 

Reinforcement Needed


constant re instruction required for safety and hand placement





Time/GCodes


Time In:  947


Time Out:  1011


Total Billed Treatment Time:  24


Total Billed Treatment


1 visit


FA 9 min


GT 15 min











TINO JEFFERSON PTA Dec 5, 2018 10:17

## 2018-12-05 NOTE — PROGRESS NOTE-HOSPITALIST
Subjective


HPI/CC On Admission


Date Seen by Provider:  Dec 5, 2018


Time Seen by Provider:  11:08


Mr. Colon is a 68-year-old white male who had been staying with his sister who 

brought him into the emergency room the evening of  reporting she 

could no longer care for him.  According to the emergency room physician she 

was quite small and frail herself and stated that he had been increasingly 

confused over the past 1-2 weeks had become generally weak despite a reasonable 

appetite and it had several falls.  There have been no reported loss of 

consciousness or head trauma.  He was pleasant upon my arrival voicing no 

complaints but thought he was in the [facility] and did not know why he 

was here.  All he could tell me was that things got worse suddenly but did not 

elaborate.  He did begin sentences and then stop in mid sentence and continue 

to eat.  He denied any problems with headaches vision change on the reporting 

generalized weakness.  He denied any pain and denies any focal areas of 

weakness or numbness.  He reported no problems with bowel or bladder control 

although his history is somewhat suspect.  He also denied night sweats chills 

fever or chest pain cough or hemoptysis.


Subjective/Events-last exam


Pt is sitting in bed and seems slightly more oriented today. No complaints.





Objective


Exam


Vital Signs





Vital Signs








  Date Time  Temp Pulse Resp B/P (MAP) Pulse Ox O2 Delivery O2 Flow Rate FiO2


 


18 08:16 96.3 58 18 122/79 (93) 97 Room Air  





Capillary Refill : Less Than 3 Seconds


General Appearance:  No Apparent Distress, WD/WN


Cardiovascular:  Regular Rate, Rhythm, No Murmur


Gastrointestinal:  Normal Bowel Sounds, Soft


Neurologic/Psychiatric:  Alert, Disoriented





Results/Procedures


Lab


Patient resulted labs reviewed.





Assessment/Plan


Assessment and Plan


Assess & Plan/Chief Complaint


1.  Altered mental status and right lower extremity weakness- likely due to 

diffuse cerebral metastasis with significant diffuse lymphadenopathy. Underwent 

biopsy .  Would likely benefit from palliative care


2.  Diffuse cerebral tumors- likely metastatic disease. Imaging reveals masses 

in brain, lungs, mediastinum, and all throughout abdomen- Continue Decadron. 

Path shows no malignancy but was poorly viable specimen. Plan for CT guided 

biopsy today.


3.  History of alcohol use disorder- [place] assess ordered


4.  Tobaccoism likely contributing to number 1 tissue diagnosis pending


5.  Deconditioning secondary to number 1 - PT ordered, will need nursing home 

placement. Referrals sent yesterday





Diagnosis/Problems


Diagnosis/Problems





(1) Cancer with unknown primary site


(2) Brain lesion


Status:  Acute


(3) Weakness


Status:  Acute


(4) Cerebral edema


Status:  Acute





Clinical Quality Measures


DVT/VTE Risk/Contraindication:


Risk Factor Score Per Nursin


RFS Level Per Nursing on Admit:  4+=Very High











TODD GARCIA MD Dec 5, 2018 11:11

## 2018-12-06 VITALS — DIASTOLIC BLOOD PRESSURE: 73 MMHG | SYSTOLIC BLOOD PRESSURE: 125 MMHG

## 2018-12-06 VITALS — SYSTOLIC BLOOD PRESSURE: 131 MMHG | DIASTOLIC BLOOD PRESSURE: 80 MMHG

## 2018-12-06 VITALS — DIASTOLIC BLOOD PRESSURE: 77 MMHG | SYSTOLIC BLOOD PRESSURE: 129 MMHG

## 2018-12-06 VITALS — SYSTOLIC BLOOD PRESSURE: 124 MMHG | DIASTOLIC BLOOD PRESSURE: 99 MMHG

## 2018-12-06 VITALS — SYSTOLIC BLOOD PRESSURE: 130 MMHG | DIASTOLIC BLOOD PRESSURE: 61 MMHG

## 2018-12-06 VITALS — DIASTOLIC BLOOD PRESSURE: 76 MMHG | SYSTOLIC BLOOD PRESSURE: 117 MMHG

## 2018-12-06 VITALS — SYSTOLIC BLOOD PRESSURE: 144 MMHG | DIASTOLIC BLOOD PRESSURE: 82 MMHG

## 2018-12-06 RX ADMIN — DEXAMETHASONE SCH MG: 4 TABLET ORAL at 11:58

## 2018-12-06 RX ADMIN — DEXAMETHASONE SCH MG: 4 TABLET ORAL at 05:50

## 2018-12-06 RX ADMIN — DEXAMETHASONE SCH MG: 4 TABLET ORAL at 18:37

## 2018-12-06 RX ADMIN — DEXAMETHASONE SCH MG: 4 TABLET ORAL at 00:01

## 2018-12-06 RX ADMIN — DEXAMETHASONE SCH MG: 4 TABLET ORAL at 23:46

## 2018-12-06 RX ADMIN — Medication SCH ML: at 21:59

## 2018-12-06 RX ADMIN — Medication SCH ML: at 05:50

## 2018-12-06 RX ADMIN — Medication SCH ML: at 18:37

## 2018-12-06 RX ADMIN — PANTOPRAZOLE SODIUM SCH MG: 40 TABLET, DELAYED RELEASE ORAL at 06:09

## 2018-12-06 NOTE — PROGRESS NOTE-HOSPITALIST
Subjective


HPI/CC On Admission


Date Seen by Provider:  Dec 6, 2018


Time Seen by Provider:  13:16


Mr. Colon is a 68-year-old white male who had been staying with his sister who 

brought him into the emergency room the evening of  reporting she 

could no longer care for him.  According to the emergency room physician she 

was quite small and frail herself and stated that he had been increasingly 

confused over the past 1-2 weeks had become generally weak despite a reasonable 

appetite and it had several falls.  There have been no reported loss of 

consciousness or head trauma.  He was pleasant upon my arrival voicing no 

complaints but thought he was in the [facility] and did not know why he 

was here.  All he could tell me was that things got worse suddenly but did not 

elaborate.  He did begin sentences and then stop in mid sentence and continue 

to eat.  He denied any problems with headaches vision change on the reporting 

generalized weakness.  He denied any pain and denies any focal areas of 

weakness or numbness.  He reported no problems with bowel or bladder control 

although his history is somewhat suspect.  He also denied night sweats chills 

fever or chest pain cough or hemoptysis.


Subjective/Events-last exam


Pt has no complaints. laying in bed watching TV with family at bedside.





Objective


Exam


Vital Signs





Vital Signs








  Date Time  Temp Pulse Resp B/P (MAP) Pulse Ox O2 Delivery O2 Flow Rate FiO2


 


18 12:00 98.4 66 18 130/61 (84) 96 Room Air  





Capillary Refill : Less Than 3 Seconds


General Appearance:  No Apparent Distress, WD/WN


Cardiovascular:  Regular Rate, Rhythm, No Murmur


Gastrointestinal:  Normal Bowel Sounds, Non Tender, Soft





Results/Procedures


Lab


Patient resulted labs reviewed.





Assessment/Plan


Assessment and Plan


Assess & Plan/Chief Complaint


1.  Altered mental status and right lower extremity weakness- likely due to 

diffuse cerebral metastasis with significant diffuse lymphadenopathy. Underwent 

biopsy .  Would likely benefit from palliative care


2.  Diffuse cerebral tumors- likely metastatic disease. Imaging reveals masses 

in brain, lungs, mediastinum, and all throughout abdomen- Continue Decadron. 

Path shows no malignancy but was poorly viable specimen, further testing to be 

back today.


3.  History of alcohol use disorder- [place] assess ordered


4.  Tobaccoism likely contributing to number 1 tissue diagnosis pending


5.  Deconditioning secondary to number 1 - PT ordered, will need nursing home 

placement. Referrals sent and may have bed at facility in [place].


6. DC Planning: Await bed at SNF, would likely benefit from hospice enrollment 

on discharge. Dr Mcclellan plans to have family meeting once path back.





Diagnosis/Problems


Diagnosis/Problems





(1) Cancer with unknown primary site


(2) Brain lesion


Status:  Acute


(3) Weakness


Status:  Acute


(4) Cerebral edema


Status:  Acute





Clinical Quality Measures


DVT/VTE Risk/Contraindication:


Risk Factor Score Per Nursin


RFS Level Per Nursing on Admit:  4+=Very High











TODD GARCIA MD Dec 6, 2018 13:21

## 2018-12-06 NOTE — ONCOLOGY PROGRESS NOTE
Subjective


Date Seen by a Provider:  Dec 6, 2018


Time Seen by a Provider:  15:47


Subjective/Events-last exam


Pt is comfortable in bed. 


Still confused. Thinking he is in the hospital in [place].


He does not want to talk about his medical condition. He wants his sister to 

handle it.


I spoke with his sister who is the care giver and daughter about the diagnosis 

of small cell lung cancer with extensive mets. They both want patient to go to 

nursing home with hospice.





Data Review


Radiology


CT of the Head 18 w w/o





There are numerous circumscribed hyperdense lesions throughout


bilateral cerebral and cerebellar hemispheres. There is also a


mass located within the khoi. The largest lesion is in the left


frontal lobe measuring 2 cm in diameter. The lesions range from a


7 mm to 20 mm. There is a moderate amount of perilesional edema


present. No midline shift is seen. No hydrocephalus is


identified.





IMPRESSION: Innumerable rounded hyperdense lesions throughout


bilateral cerebral and cerebellar hemispheres with surrounding


vasogenic edema. Features are most suggestive of widespread


intracranial metastatic disease.








NAME:      ALECIA FRANCES


MED REC#:   [id number]


ACCOUNT#:   [id number]


PHYSICIAN:    ERIC YADAV MD


 











Date of Exam:   18





CT CHEST/ABDOMEN/PELVIS W


 





PROCEDURE: CT chest, abdomen, and pelvis with contrast.





TECHNIQUE: Multiple contiguous axial images were obtained through


the chest, abdomen, and pelvis after the administration of


intravenous contrast.





INDICATION: Brain lesions, altered mental status, cough.





FINDINGS:





Chest:





There is extensive thoracic lymphadenopathy, most suggestive of


widespread metastatic disease. Abnormal ovoid axillary nodes on


the left measured a maximal diameter of 2.9 cm. Right paramedian


prevascular anterior mediastinal orly mass is 2.7 cm. There is a


large aggregate confluent right superior paratracheal oryl mass


displacing the junction of the innominate bones and SVC


anteriorly without venous obstruction. At the level of the


junction of the innominates, that mass measures 6.4 x 4.5 cm and


deviates the contour of the adjacent right lateral tracheal wall.


Its caudal extent of the mass is contiguous with right lower


paratracheal mediastinal adenopathy as well as contiguous with


right hilar adenopathy. The mass in aggregate at the level of the


torin is measuring 7.8 x 4.8 cm. There is subcarinal


lymphadenopathy. The largest subcarinal mass measuring 4.1 cm.


There is paraesophageal adenopathy measuring 2 cm. A right


superior pulmonary hilar node measures 2 cm. The left lower lobe


lung mass is likely metastatic measuring 1 cm. Subpleural mass in


the right lower lobe measured 8 mm. Right basilar pulmonary mass


measures 19 mm.





Abdomen and pelvis:





There are findings of widespread multifocal hepatic metastatic


disease involving left and right lobes. The largest right lobe


mass is 4.3 cm. A mass in the caudate lobe is 2.8 cm. Largest


left lobe mass is 2 cm. Thicker marker bilateral adrenal masses,


presumptively metastatic. The larger on the right measured 4 x


2.6 cm. There is upper abdominal mesenteric lymphadenopathy. The


largest bilobed lesion is 3.7 cm long axis. Pancreas itself


appeared unremarkable. There is no bile duct dilatation. The


unobstructed kidneys were normal. There is splenic granulomata,


benign and incidental, as well as adjacent splenules noted


incidentally. A mass lateral to the upper pole of the left kidney


and distal to the pancreatic tail is 1.5 cm, presumed metastatic


deposit. There is unruptured atherosclerotic irregular infrarenal


abdominal aortic aneurysm measuring 4.5 x 4.0 cm in transverse


dimensions. The aneurysm extends a cephalocaudal length of about


5 cm. It terminates prior to the aortic bifurcation and the


common internal and external iliac branches are calcified but


patent and nonaneurysmal. There is colonic constipation and


extensive sigmoid diverticulosis without features of acute


diverticulitis. The appendix is normal. Colon stool load, its


tortuosity, and its diverticulation is limited in its evaluation.


No identifiable colonic mass was found. Urinary bladder appeared


unremarkable. There was no suspicious lytic or sclerotic bony


lesion.





IMPRESSION:


1. A metastatic pattern in the chest with hilar, axillary, and


mediastinal lymphadenopathy. Multiple lung masses, likely


metastatic. Background COPD and interstitial lung disease,


chronic. No thoracic effusion or chest wall lesion evident


2. Abdomen and pelvis: Widespread metastatic disease to the


liver, abdominal lymph nodes, and adrenal glands. Diverticulated


and constipated colon appeared otherwise nonfocal. Unruptured


atherosclerotic infrarenal abdominal aortic aneurysm.





Dictated by: 





  Dictated on workstation # [id number]





[id number]





Dict:      18


Trans:      18





Interpreted by:         GIOVANNI FRAUSTO


Electronically signed by:   GIOVANNI FRAUSTO 11/30/18 1752





Physical Exam


Vital Signs





Vital Signs - First Documented








 18





 11:48 15:22 15:30


 


Temp 96.2  


 


Pulse 87  


 


Resp 18  


 


B/P (MAP) 112/83 (93)  


 


Pulse Ox  99 


 


O2 Delivery   Room Air





Capillary Refill : Less Than 3 Seconds


Height, Weight, BMI


Height: 5'10.00"


Weight: 180lbs. 0.0oz. [id number]; 25.8 BMI


Method:Estimated


General Appearance:  No Apparent Distress


HEENT:  PERRL/EOMI


Respiratory:  Lungs Clear, No Accessory Muscle Use, No Respiratory Distress


Cardiovascular:  Regular Rate, Rhythm, No Edema


Gastrointestinal:  Non Tender, Soft


Neurologic/Psychiatric:  Other (confused. )





Impression & Plan


Impression & Plan


IMP:


1. Multiple brain lesions with surrounding edema, symptoms of confusion and 

general weakness. 


2. Small cell lung cancer, extensive mets. Multiple lung lesions, adrenal and 

liver lesions. 


3. H/o alcoholism


4. COPD


5. His sister is not able to take care of him at home any more.


Plan:


1. Pt can be discharged to nursing home in [facility] with hospice, Dr Stone to 

decide the time and date. 


2. NO chemoradiation treatment. 


3. Continue Decadron 4mg po qid for 2 more days, then change 4mg bid till the 

end. 


4. Pepcid or PI while on Decadron


5. Pt currently has not pain. So I would not start any pain meds yet.





Clinical Quality Measures


DVT/VTE Risk/Contraindication:


Risk Factor Score Per Nursin


RFS Level Per Nursing on Admit:  4+=Very High











WHITLEY BLOCK MD Dec 6, 2018 15:55

## 2018-12-06 NOTE — PHYSICAL THERAPY DAILY NOTE
PT Daily Note-Current


Subjective


No new c/o's.





Pain





   Numeric Pain Scale:  0-No Pain





Appearance


Upon arrival, pt supine in bed with head elevated and watching TV, bed alarm on.


At end of session, pt supine in bed, head elevated, bed alarm activated, call 

light within reach. Nsg getting pt coffee upon pt's request





Mental Status


Patient Orientation:  Person, Confused





Transfers


Therapy Code Descriptions/Definitions 





Functional [place] Measure:


0=Not Assessed/NA        4=Minimal Assistance


1=Total Assistance        5=Supervision or Setup


2=Maximal Assistance  6=Modified [place]


3=Moderate Assistance 7=Complete [place]








Therapy Quality Codes:


6    Independent with activity with or without an assistive device


5    Patient requires set up or clean up by helper.  Patient completes activity

  by  themselves


4    Supervision or touching assist (CGA). [place] provide cues , steadying 

assist


3    The helper provides less than half the effort to complete the activity


2    The helper provides more than half the effort to complete the activity


1    Dependent.  The helper does all the effort to complete an activity 


7    Patient refused to complete or attempt activity


9    The patient did not perform the activity before the current illness or 

injury


88  Not attempted due to Medical conditions or safety concerns


Transfers (B, C, W/C) (FIM):  4


Scootin


Rollin


Supine to/from Sit:  5


Sit to/from Stand:  4


CGA with sit to from stand for safety, constant verb inst and encouragement for 

safety and hand placement during transitions





Weight Bearing


Right Lower Extremity:  Right


Weight Bearing/Tolerated


Left Lower Extremity:  Left


Weight Bearing/Tolerated





Gait Training


Gait (FIM):  4


Distance (FIM):  3=150 ft


Distance:  500


Gait Level of Assist:  4


Gait Persons Needed:  1


Gait Assistive Device:  FWW


antalgic gait, right ankle inversion causing pt to walk on lateral aspect of 

foot, right knee hyperextension at times, shuffling gait, poor body positioning 

in walker, keeps walker too far forward and to the left of his body, is able to 

correct but requiring almost constant verb inst


x7 LOB episodes requiring min assist to correct





Treatments


transfer and gait training





Assessment


pt continues with LOB episodes during gait, requiring min assist to correct. 

LOB with sit to stand transfers at times. Continues with decreased safety 

awareness and requiring re instruction for hand placement during all transitions





PT Short Term Goals


Short Term Goals


Time Frame:  Dec 4, 2018





PT Long Term Goals


Long Term Goals


PT Long Term Goals Time Frame:  Dec 10, 2018


Transfers (B,C,W/C) (FIM):  6


Gait (FIM):  5


Gait distance (FIM):  6=915-11 ft


Distance:  50'


Gait Level of Assist:  5


Gait Assistive Device:  FWW





PT Plan


Problem List


Problem List:  Functional Strength, Safety, Balance, Gait, Transfer





Treatment/Plan


Treatment Plan:  Continue Plan of Care


Treatment Plan:  Bed Mobility, Education, Functional Activity [place], Functional 

Strength, Gait, Safety, Therapeutic Exercise, Transfers


Treatment Duration:  Dec 10, 2018


Frequency:  6 times per week


Estimated Hrs Per Day:  .25 hour per day


Patient and/or Family Agrees t:  Yes





Safety Risks/Education


Patient Education:  Gait Training, Transfer Techniques, Safety Issues


Teaching Recipient:  Patient


Teaching Methods:  Demonstration, Discussion


Response to Teaching:  Verbalize Understanding, Return Demonstration, 

Reinforcement Needed





Discharge Recommendations


Barriers to Progress


confusion and memory





Time/GCodes


Time In:  1027


Time Out:  1047


Total Billed Treatment Time:  20


Total Billed Treatment


1 visit


Gait x1











TINO JEFFERSON PTA Dec 6, 2018 10:53

## 2018-12-07 VITALS — SYSTOLIC BLOOD PRESSURE: 134 MMHG | DIASTOLIC BLOOD PRESSURE: 85 MMHG

## 2018-12-07 RX ADMIN — PANTOPRAZOLE SODIUM SCH MG: 40 TABLET, DELAYED RELEASE ORAL at 06:33

## 2018-12-07 RX ADMIN — DEXAMETHASONE SCH MG: 4 TABLET ORAL at 06:33

## 2018-12-07 RX ADMIN — Medication SCH ML: at 06:33

## 2018-12-07 RX ADMIN — DEXAMETHASONE SCH MG: 4 TABLET ORAL at 12:29

## 2018-12-07 NOTE — DISCHARGE SUMMARY-HOSPITALIST
Diagnosis/Chief Complaint


Date of Admission


2018 at 14:38


Date of Discharge





Discharge Date:  Dec 7, 2018


Admission Diagnosis


1.  Altered mental status and right lower extremity weakness secondary to 

likely rather diffuse cerebral metastasis with significant mediastinal 

adenopathy left axillary adenopathy and right pathologic feeling lymph node 

under the external jugular vein.  After discussion with Dr. Dupont and Dr. Raymond we are in agreement that the external jugular node likely be the safest 

way to get tissue.   for discussion is in agreement as well and will be 

seeing the patient.  In the interim we'll continue Decadron 4 mg IV every 6 

with dosage management per oncology.  The patient will likely require placement 

in a nursing care facility upon discharge.


2.  History of alcohol use disorder it is unclear as to whether or not there's 

been any recent drinking.  There is no history of any other alcohol related 

medical complications.  3.  Tobaccoism likely contributing to number 1 tissue 

diagnosis pending.


Discharge Diagnosis





(1) Cancer with unknown primary site


(2) Brain lesion


Status:  Acute


(3) Weakness


Status:  Acute


(4) Cerebral edema


Status:  Acute





Discharge Summary


Discharge Physical Exam


Allergies:  


Coded Allergies:  


     No Known Drug Allergies (Unverified , 12)


Vitals & I&Os





Vital Signs








  Date Time  Temp Pulse Resp B/P (MAP) Pulse Ox O2 Delivery O2 Flow Rate FiO2


 


18 23:58 97.9 101 14 144/82 (102) 94 Room Air  











Hospital Course


Labs (last 24 hrs)





Microbiology


18 MRSA Screen - Final, Complete


          MRSA not isolated


Patient resulted labs reviewed.





Discharge


Home Medications:





Active Scripts


Active





Instructions to patient/family


Please see electronic discharge instructions given to patient.





Clinical Quality Measures


DVT/VTE Risk/Contraindication:


Risk Factor Score Per Nursin


RFS Level Per Nursing on Admit:  4+=Very High











TODD GARCIA MD Dec 7, 2018 09:11

## 2018-12-07 NOTE — PHYSICAL THERAPY DAILY NOTE
PT Daily Note-Current


Subjective


Patient sitting EOB pre tx, just got through going to the bathroom with 

nursing.  Agrees to PT after encouragement, no complaints of pain.





Appearance


Patient in bed post tx with nurse call, phone, tray, all needs met, bed alarm 

on.





Mental Status


Patient Orientation:  Person, Confused





Transfers


Therapy Code Descriptions/Definitions 





Functional [place] Measure:


0=Not Assessed/NA        4=Minimal Assistance


1=Total Assistance        5=Supervision or Setup


2=Maximal Assistance  6=Modified [place]


3=Moderate Assistance 7=Complete [place]








Therapy Quality Codes:


6    Independent with activity with or without an assistive device


5    Patient requires set up or clean up by helper.  Patient completes activity

  by  themselves


4    Supervision or touching assist (CGA). [place] provide cues , steadying 

assist


3    The helper provides less than half the effort to complete the activity


2    The helper provides more than half the effort to complete the activity


1    Dependent.  The helper does all the effort to complete an activity 


7    Patient refused to complete or attempt activity


9    The patient did not perform the activity before the current illness or 

injury


88  Not attempted due to Medical conditions or safety concerns


Transfers (B, C, W/C) (FIM):  4


Scootin


Rollin


Supine to/from Sit:  5


Sit to/from Stand:  5


Bed to/from Chair:  4


CGA for transfers





Weight Bearing


Right Lower Extremity:  Right


Weight Bearing/Tolerated


Left Lower Extremity:  Left


Weight Bearing/Tolerated





Gait Training


Gait (FIM):  4


Distance:  500'


Gait Level of Assist:  4


Gait Persons Needed:  1


Gait Assistive Device:  FWW


Patient ambulates with the walker too far forward, needs assist with balance 

and guiding walker.  He has a right ankle deformity and ambulates on the 

outside of his foot and often had right knee hyperextension.





Treatments


bed mobility, transfers, ambulation





Assessment


Current Status:  Poor Progress


Patient is not motivated to perform therapy, needed a lot of encouragement to 

participate.





PT Short Term Goals


Short Term Goals


Time Frame:  Dec 4, 2018





PT Long Term Goals


Long Term Goals


PT Long Term Goals Time Frame:  Dec 10, 2018


Transfers (B,C,W/C) (FIM):  6


Gait (FIM):  5


Gait distance (FIM):  1=757-60 ft


Distance:  50'


Gait Level of Assist:  5


Gait Assistive Device:  FWW





PT Plan


Problem List


Problem List:  Activity Tolerance, Functional Strength, Safety, Balance, Gait, 

Transfer, Bed Mobility, ROM





Treatment/Plan


Treatment Plan:  Continue Plan of Care


Treatment Plan:  Bed Mobility, Education, Functional Activity [place], Functional 

Strength, Gait, Safety, Therapeutic Exercise, Transfers


Treatment Duration:  Dec 10, 2018


Frequency:  6 times per week


Estimated Hrs Per Day:  .25 hour per day


Patient and/or Family Agrees t:  Yes





Safety Risks/Education


Patient Education:  Gait Training, Transfer Techniques, Correct Positioning, 

Safety Issues


Teaching Recipient:  Patient


Teaching Methods:  Demonstration, Discussion


Response to Teaching:  Reinforcement Needed





Time/GCodes


Time In:  0820


Time Out:  0835


Total Billed Treatment Time:  15


Total Billed Treatment


1 visit


GT 15'











MARLEEN DEAN PT Dec 7, 2018 08:48